# Patient Record
Sex: MALE | Race: WHITE | HISPANIC OR LATINO | Employment: FULL TIME | URBAN - METROPOLITAN AREA
[De-identification: names, ages, dates, MRNs, and addresses within clinical notes are randomized per-mention and may not be internally consistent; named-entity substitution may affect disease eponyms.]

---

## 2017-04-06 ENCOUNTER — OFFICE VISIT (OUTPATIENT)
Dept: INTERNAL MEDICINE | Facility: CLINIC | Age: 37
End: 2017-04-06
Payer: COMMERCIAL

## 2017-04-06 VITALS
DIASTOLIC BLOOD PRESSURE: 94 MMHG | WEIGHT: 239.44 LBS | TEMPERATURE: 98 F | BODY MASS INDEX: 33.39 KG/M2 | SYSTOLIC BLOOD PRESSURE: 132 MMHG | HEART RATE: 80 BPM

## 2017-04-06 DIAGNOSIS — Z00.00 ROUTINE GENERAL MEDICAL EXAMINATION AT A HEALTH CARE FACILITY: Primary | ICD-10-CM

## 2017-04-06 DIAGNOSIS — R53.83 FATIGUE, UNSPECIFIED TYPE: ICD-10-CM

## 2017-04-06 DIAGNOSIS — N34.2 URETHRITIS: ICD-10-CM

## 2017-04-06 DIAGNOSIS — R30.0 DYSURIA: ICD-10-CM

## 2017-04-06 DIAGNOSIS — B00.9 HSV (HERPES SIMPLEX VIRUS) INFECTION: ICD-10-CM

## 2017-04-06 PROCEDURE — 99214 OFFICE O/P EST MOD 30 MIN: CPT | Mod: S$GLB,,, | Performed by: INTERNAL MEDICINE

## 2017-04-06 PROCEDURE — 1160F RVW MEDS BY RX/DR IN RCRD: CPT | Mod: S$GLB,,, | Performed by: INTERNAL MEDICINE

## 2017-04-06 PROCEDURE — 99999 PR PBB SHADOW E&M-EST. PATIENT-LVL III: CPT | Mod: PBBFAC,,, | Performed by: INTERNAL MEDICINE

## 2017-04-06 RX ORDER — VALACYCLOVIR HYDROCHLORIDE 1 G/1
1000 TABLET, FILM COATED ORAL 2 TIMES DAILY
Qty: 14 TABLET | Refills: 2 | Status: SHIPPED | OUTPATIENT
Start: 2017-04-06 | End: 2021-11-05

## 2017-04-06 RX ORDER — AZITHROMYCIN 1 G/1
1 POWDER, FOR SUSPENSION ORAL ONCE
Qty: 1 PACKET | Refills: 0 | Status: SHIPPED | OUTPATIENT
Start: 2017-04-06 | End: 2017-04-06

## 2017-04-10 ENCOUNTER — OFFICE VISIT (OUTPATIENT)
Dept: INTERNAL MEDICINE | Facility: CLINIC | Age: 37
End: 2017-04-10
Payer: COMMERCIAL

## 2017-04-10 DIAGNOSIS — Z71.89 ENCOUNTER FOR MEDICATION REVIEW AND COUNSELING: Primary | ICD-10-CM

## 2017-04-10 DIAGNOSIS — E78.2 MIXED HYPERLIPIDEMIA: ICD-10-CM

## 2017-04-10 DIAGNOSIS — B00.9 ROUTINE CULTURES POSITIVE FOR HSV1: ICD-10-CM

## 2017-04-10 DIAGNOSIS — R79.89 ELEVATED LFTS: ICD-10-CM

## 2017-04-10 DIAGNOSIS — R82.81 PYURIA: ICD-10-CM

## 2017-04-10 PROCEDURE — 99999 PR PBB SHADOW E&M-EST. PATIENT-LVL I: CPT | Mod: PBBFAC,,, | Performed by: INTERNAL MEDICINE

## 2017-04-10 PROCEDURE — 99213 OFFICE O/P EST LOW 20 MIN: CPT | Mod: S$GLB,,, | Performed by: INTERNAL MEDICINE

## 2017-04-10 PROCEDURE — 1160F RVW MEDS BY RX/DR IN RCRD: CPT | Mod: S$GLB,,, | Performed by: INTERNAL MEDICINE

## 2017-04-10 NOTE — PROGRESS NOTES
Subjective:       Patient ID: Jl Haynes is a 36 y.o. male.    Chief Complaint: Annual Exam    HPIPleasant young gentleman from [place] originally here for his annual exam. Overall doing well, but he reports continued discomfort in the l testicle. This is not new, but he has seen Urology in the past for epididymitis and treated symptomatically. He currently is having some dysuria, but denies penile dc.  While conversing with him, I noticed small blister-like lesion lower lip. It appeared c/w herpes infection and I discussed this issue with him. This was his first outbreak stating that he recently noted it after having sexual intercourse with his female partner. He also noted the slight dysuria as well. I discussed HSV infection in particular and STD's in general. The importance of contact prevention was emphasized. He stated he had never had these issues before. I will obtain blood work to look into this issue and treat accordingly. I therefore gave him a rx for Valtrex with instructions as well as single dose of Zithromax 1 gram for NC urethritis. I recommended he discuss this issues with his partner and have her follow up with her physician also. Otherwise doing well.  Review of Systems   Constitutional: Positive for fatigue. Negative for activity change and unexpected weight change.   HENT: Negative.    Eyes: Negative for visual disturbance.   Respiratory: Negative for cough, shortness of breath and wheezing.    Cardiovascular: Negative for chest pain, palpitations and leg swelling.   Gastrointestinal: Negative for abdominal distention, abdominal pain and blood in stool.   Endocrine: Negative.    Genitourinary: Positive for dysuria and testicular pain. Negative for decreased urine volume, difficulty urinating, discharge, frequency, hematuria and urgency.   Musculoskeletal: Negative for arthralgias, back pain and joint swelling.   Skin: Negative.    Neurological: Negative for dizziness, weakness,  light-headedness and headaches.   Hematological: Negative.        Objective:      Physical Exam   Constitutional: He is oriented to person, place, and time. He appears well-developed and well-nourished. No distress.   HENT:   Head: Normocephalic and atraumatic.   Right Ear: External ear normal.   Left Ear: External ear normal.   Mouth/Throat: Oropharynx is clear and moist. No oropharyngeal exudate.   Eyes: Conjunctivae and EOM are normal. Pupils are equal, round, and reactive to light. Right eye exhibits no discharge. Left eye exhibits no discharge. No scleral icterus.   Neck: Normal range of motion. Neck supple. No JVD present. No thyromegaly present.   Cardiovascular: Normal rate, regular rhythm, normal heart sounds and intact distal pulses.    No murmur heard.  Pulmonary/Chest: Effort normal and breath sounds normal. No respiratory distress. He has no wheezes. He exhibits no tenderness.   Abdominal: Soft. Bowel sounds are normal. He exhibits no distension and no mass. There is no tenderness.   Genitourinary: Penis normal.   Genitourinary Comments: Testes descended. No testicular masses. Mld tenderness along L epididymis. No urethral meatus discharge.   Musculoskeletal: Normal range of motion. He exhibits no edema or tenderness.   Lymphadenopathy:     He has no cervical adenopathy.   Neurological: He is alert and oriented to person, place, and time. No cranial nerve deficit.   Skin: Skin is warm and dry. He is not diaphoretic. No erythema.   Small, blister-like lesion L bottom lip.   Psychiatric: He has a normal mood and affect. His behavior is normal. Judgment and thought content normal.   Nursing note and vitals reviewed.      Assessment:       1. Routine general medical examination at a health care facility    2. HSV (herpes simplex virus) infection    3. Fatigue, unspecified type    4. Dysuria    5. Urethritis     6.     Chronic testicular pain - Hx of epididymitis.  Plan:    1. Obtain labs including HSV 1&2  IgM/IgG serology.         2. Obtain urine.         3. Rx for Valtrex 1 gram tid x 7 days as qwll rx for Zithromax 1 gram po x 1 dose. Risks/benfits discussed.         4. Refer to Urology.         5. RTC for review.

## 2017-04-10 NOTE — MR AVS SNAPSHOT
[facility]  [address]  [place] [address]  Phone: [phone]  Fax: [phone]                  Jl Haynes   4/10/2017 2:00 PM   Office Visit    Descripción:  Male : 1980   Personal Médico:  Omid Nix MD   Departamento:  [facility]           Razón de la charo     Follow-up           Diagnósticos de Esta Visita        Comentarios    Encounter for medication review and counseling    -  Primario     Routine cultures positive for HSV1         Mixed hyperlipidemia         Elevated LFTs         Pyuria                Lista de tareas           Citas próximas        Personal Médico Departamento Tfno del dpto    2017 3:00 PM [facility] - Urology 4th Floor [phone]      Metas (5 Years of Data)     Ninguna      Follow-Up and Disposition     Return in about 6 months (around 10/10/2017).      [facility]     [facility] Enfermeras - Asistencia   Enfermeras registradas de [facility] pueden ayudarle a reservar yakov charo, proveer educación para la michael, asesoría clínica, y otros servicios de asesoramiento.   Llame para clarisse servicio gratuito a [phone].             Medicamentos           Mensaje sobre Medicamentos     Verificar los cambios y / o adiciones a ferrera régimen de medicación son los mismos que discutir con ferrera médico. Si cualquiera de estos cambios o adiciones son incorrectos, por favor notifique a ferrera proveedor de atención médica.             Verifique que la siguiente lista de medicamentos es yakov representación exacta de los medicamentos que está tomando actualmente. Si no hay ningunos reportados, la lista puede estar en shirley. Si no es correcta, por favor póngase en contacto con ferrera proveedor de atención médica. Lleve esta lista con usted en sen de emergencia.           Medicamentos Actuales     ibuprofen (ADVIL,MOTRIN) 600 MG tablet Take 1 tablet (600 mg total) by mouth every 6 (six)  hours as needed for Pain.    valacyclovir (VALTREX) 1000 MG tablet Take 1 tablet (1,000 mg total) by mouth 2 (two) times daily.           Información de referencia clínica           Alergias     A partir del:  4/10/2017        Penicillins      Vacunas     Administradas en la fecha de la visita:  4/10/2017        None      Registrarse para [facility]     La activación de ferrera cuenta [facility] es tan fácil rose 1-2-3!    1) Ir a [email or web address], seleccione Registrarse Ahora, meter el código de activación y ferrera fecha de nacimiento, y seleccione Próximo.    [id number]  Expires: 5/25/2017  3:22 PM      2) Crear un nombre de usuario y contraseña para usar cuando se visita [facility] en el futuro y selecciona yakov pregunta de seguridad en sen de que pierda ferrera contraseña y seleccione Próximo.    3) Introduzca ferrera dirección de correo electrónico y enrique clpolina en Registrarse!    Información Adicional  Si tiene alguna pregunta, por favor, e-mail [email or web address] o llame al [phone] para hablar con nuestro personal. Recuerde, [facility] no debe ser usada para necesidades urgentes. En sen de emergencia médica, llame al [phone].        Smoking Cessation     Si desea dejar de fumar:  · Usted puede ser elegible para recibir servicios gratuitos si usted es un residente de [place] y comenzó a fumar cigarrillos antes del 1 de septiembre de 1988. Llame al Smoking Cessation Trust (SCT) a [phone] o [phone].   Llame 1-800-QUIT-NOW si usted no cumple con los criterios anteriores.   Póngase en contacto con nosotros por correo electrónico: [email or web address]   Visite nuestro sitio web para obtener más información: [email or web address]            Language Assistance Services     ATTENTION: Language assistance services are available, free of charge. Please call [phone].      ATENCIÓN: Si habla español, tiene a ferrera disposición servicios gratuitos de asistencia lingüística. Llame al [phone].     CHÚ  Ý: N?u b?n nói Ti?ng Vi?t, có các d?ch v? h? tr? ngôn ng? mi?n phí dành cho b?n. G?i s? [phone].         [facility] cumple con las leyes federales aplicables de derechos civiles y no discrimina por motivos de jazmin, color, origen nacional, edad, discapacidad, o sexo.                 Jl Haynes   4/10/2017 2:00 PM   Office Visit    Description:  Male : 1980   Provider:  Omid Nix MD   Department:  [facility]           Reason for Visit     Follow-up           Diagnoses this Visit        Comments    Encounter for medication review and counseling    -  Primary     Routine cultures positive for HSV1         Mixed hyperlipidemia         Elevated LFTs         Pyuria                To Do List           Future Appointments        Provider Department Dept Phone    2017 3:00 PM [facility] - Urology 4th Floor [phone]      Goals     None      Follow-Up and Disposition     Return in about 6 months (around 10/10/2017).      [facility]     [facility] Nurse Care Line -  Assistance  Unless otherwise directed by your provider, please contact [facility], our nurse care line that is available for  assistance.     Registered nurses in the [facility] provide: appointment scheduling, clinical advisement, health education, and other advisory services.  Call: [phone] (toll free)               Medications           Message regarding Medications     Verify the changes and/or additions to your medication regime listed below are the same as discussed with your clinician today.  If any of these changes or additions are incorrect, please notify your healthcare provider.             Verify that the below list of medications is an accurate representation of the medications you are currently taking.  If none reported, the list may be blank. If incorrect, please contact your healthcare provider. Carry this list  with you in case of emergency.           Current Medications     ibuprofen (ADVIL,MOTRIN) 600 MG tablet Take 1 tablet (600 mg total) by mouth every 6 (six) hours as needed for Pain.    valacyclovir (VALTREX) 1000 MG tablet Take 1 tablet (1,000 mg total) by mouth 2 (two) times daily.           Clinical Reference Information           Allergies as of 4/10/2017     Penicillins      Immunizations Administered on Date of Encounter - 4/10/2017     None      [facility] Sign-Up     Activating your [facility] account is as easy as 1-2-3!     1) Visit [email or web address], select Sign Up Now, enter this activation code and your date of birth, then select Next.  [email or web address]  Expires: 5/25/2017  3:22 PM      2) Create a username and password to use when you visit [facility] in the future and select a security question in case you lose your password and select Next.    3) Enter your e-mail address and click Sign Up!    Additional Information  If you have questions, please e-mail [email or web address] or call [phone] to talk to our [facility] staff. Remember, [facility] is NOT to be used for urgent needs. For medical emergencies, dial 911.         Smoking Cessation     If you would like to quit smoking:   You may be eligible for free services if you are a [place] resident and started smoking cigarettes before September 1, 1988.  Call the Smoking Cessation Trust ([facility]) toll free at (500) 208-5838 or (232) 094-1562.   Call 1-800-QUIT-NOW if you do not meet the above criteria.   Contact us via email: [email or web address]   View our website for more information: [email or web address]        Language Assistance Services     ATTENTION: Language assistance services are available, free of charge. Please call [phone].      ATENCIÓN: Si habla español, tiene a ferrera disposición servicios gratuitos de asistencia lingüística. Llame al [phone].     CHÚ Ý: N?u b?n nói Ti?ng Vi?t, có các d?ch v? h? tr? ngôn ng? mi?n phí  dành cho b?n. G?i s? [phone].         [facility] complies with applicable Federal civil rights laws and does not discriminate on the basis of race, color, national origin, age, disability, or sex.

## 2018-03-25 ENCOUNTER — HOSPITAL ENCOUNTER (EMERGENCY)
Facility: HOSPITAL | Age: 38
Discharge: HOME | End: 2018-03-25
Attending: EMERGENCY MEDICINE
Payer: COMMERCIAL

## 2018-03-25 VITALS
BODY MASS INDEX: 34.3 KG/M2 | WEIGHT: 245 LBS | HEART RATE: 71 BPM | DIASTOLIC BLOOD PRESSURE: 85 MMHG | SYSTOLIC BLOOD PRESSURE: 138 MMHG | TEMPERATURE: 98 F | RESPIRATION RATE: 20 BRPM | HEIGHT: 71 IN | OXYGEN SATURATION: 98 %

## 2018-03-25 DIAGNOSIS — R51.9 HEADACHE: Primary | ICD-10-CM

## 2018-03-25 PROCEDURE — 96372 THER/PROPH/DIAG INJ SC/IM: CPT

## 2018-03-25 PROCEDURE — 99284 EMERGENCY DEPT VISIT MOD MDM: CPT | Mod: 25

## 2018-03-25 PROCEDURE — 63600175 PHARM REV CODE 636 W HCPCS: Performed by: EMERGENCY MEDICINE

## 2018-03-25 PROCEDURE — 25000003 PHARM REV CODE 250: Performed by: EMERGENCY MEDICINE

## 2018-03-25 RX ORDER — BUTALBITAL, ACETAMINOPHEN AND CAFFEINE 50; 325; 40 MG/1; MG/1; MG/1
1 TABLET ORAL
Status: COMPLETED | OUTPATIENT
Start: 2018-03-25 | End: 2018-03-25

## 2018-03-25 RX ORDER — KETOROLAC TROMETHAMINE 30 MG/ML
60 INJECTION, SOLUTION INTRAMUSCULAR; INTRAVENOUS
Status: COMPLETED | OUTPATIENT
Start: 2018-03-25 | End: 2018-03-25

## 2018-03-25 RX ORDER — BUTALBITAL, ACETAMINOPHEN AND CAFFEINE 50; 325; 40 MG/1; MG/1; MG/1
1 TABLET ORAL EVERY 6 HOURS PRN
Qty: 12 TABLET | Refills: 0 | Status: SHIPPED | OUTPATIENT
Start: 2018-03-25 | End: 2018-04-24

## 2018-03-25 RX ADMIN — BUTALBITAL, ACETAMINOPHEN, AND CAFFEINE 1 TABLET: 50; 325; 40 TABLET ORAL at 12:03

## 2018-03-25 RX ADMIN — KETOROLAC TROMETHAMINE 60 MG: 30 INJECTION, SOLUTION INTRAMUSCULAR at 02:03

## 2018-03-25 NOTE — ED PROVIDER NOTES
Encounter Date: 3/25/2018    SCRIBE #1 NOTE: I, Carolyn Campuzano, am scribing for, and in the presence of,  Dr. Torres. I have scribed the entire note.       History     Chief Complaint   Patient presents with    Headache     x 3 days, with sinus pressure      12:32 PM    This is a 37 y.o. male who has a past medical history of Depression and Hyperlipidemia.   The patient presents to the Emergency Department with headache.   The onset began a 1 month ago.  He specifies that he will typically have a headache twice a week; it has now progressed to everyday x3-4 days  Symptoms are associated with photophobia and sinus pressure.  Pt denies visual disturbances, dizziness, numbness, or tingling.   Symptoms are aggravated by sound and light.   No pre-hospital treatments were attempted.  Patient has had prior history of similar symptoms. Denies any trauma or injury.  Pt has no past surgical history on file.      The history is provided by the patient.     Review of patient's allergies indicates:   Allergen Reactions    Penicillins Anaphylaxis     Past Medical History:   Diagnosis Date    Depression     Hyperlipidemia      History reviewed. No pertinent surgical history.  Family History   Problem Relation Age of Onset    Prostate cancer Neg Hx     Kidney disease Neg Hx      Social History   Substance Use Topics    Smoking status: Current Some Day Smoker    Smokeless tobacco: Not on file    Alcohol use Yes      Comment: Rare social occasions.     Review of Systems   Constitutional: Negative for activity change.   HENT: Positive for sinus pressure. Negative for congestion and rhinorrhea.    Eyes: Positive for photophobia. Negative for visual disturbance.   Respiratory: Negative for shortness of breath.    Cardiovascular: Negative for chest pain.   Gastrointestinal: Negative for nausea and vomiting.   Musculoskeletal: Negative for myalgias, neck pain and neck stiffness.   Neurological: Positive for headaches. Negative  for dizziness and numbness.        No tingling sensation.   Hematological: Does not bruise/bleed easily.   Psychiatric/Behavioral: Positive for sleep disturbance. Negative for confusion.       Physical Exam     Initial Vitals [03/25/18 1122]   BP Pulse Resp Temp SpO2   (!) 160/91 101 20 98.1 °F (36.7 °C) 98 %      MAP       114         Physical Exam    Nursing note and vitals reviewed.  Constitutional: He appears well-developed and well-nourished. He is not diaphoretic. No distress.   HENT:   Head: Normocephalic and atraumatic.   Mouth/Throat: Oropharynx is clear and moist.   Eyes: Conjunctivae are normal. Pupils are equal, round, and reactive to light.   Neck: Normal range of motion. Neck supple.   Cardiovascular: Normal rate, regular rhythm, normal heart sounds and intact distal pulses.   No murmur heard.  Pulmonary/Chest: Breath sounds normal. No respiratory distress. He has no wheezes. He has no rhonchi. He has no rales.   Musculoskeletal: Normal range of motion. He exhibits no edema or tenderness.   Lymphadenopathy:     He has no cervical adenopathy.   Neurological: He is alert and oriented to person, place, and time. He has normal strength. No cranial nerve deficit or sensory deficit.   Negative pronator drift, negative finger to nose, negative heel to shin, negative dysdiadochokinesia. Negative Romberg, normal gait. GCS 15.    Skin: Skin is warm and dry. No rash noted. No erythema.   Psychiatric: He has a normal mood and affect. Thought content normal.         ED Course   Procedures  Labs Reviewed - No data to display     Imaging Results          CT Head Without Contrast (Final result)  Result time 03/25/18 13:34:32    Final result by Foster Hoffmann MD (03/25/18 13:34:32)                 Impression:      No acute intracranial abnormality.      Electronically signed by: Foster Hoffmann MD  Date:    03/25/2018  Time:    13:34             Narrative:    EXAMINATION:  CT HEAD WITHOUT CONTRAST    CLINICAL  HISTORY:  Headache, acute, norm neuro exam; Headache    TECHNIQUE:  Low dose axial images were obtained through the head.  Coronal and sagittal reformations were also performed. Contrast was not administered.    COMPARISON:  None.    FINDINGS:  The cortical sulcal pattern is normal. No hydrocephalus, midline shift or abnormal mass effect present. No intra-or extra-axial mass or hemorrhage. No CT evidence of large territory acute stroke.    Orbits are unremarkable. Paranasal sinuses are clear. Mastoid air cells are clear. Calvarium is intact.                                   Medical Decision Making:   Initial Assessment:   This is an emergent evaluation of a 37 y.o.male patient with presentation of headache fro 1month anterior cranial prcoess  Initial differentials include but are not limited to: Anterior cranial process including tumor and tension headache  Plan: CT and fioricet.    Independently Interpreted Test(s):   I have ordered and independently interpreted X-rays - see prior notes.  Clinical Tests:   Radiological Study: Ordered and Reviewed      Patient reports that headache mildly improved after Fioricet.  We'll give IM shot of Toradol and discharged with Fioricet as needed.  Head CT was unremarkable for acute intracranial process.  Overall impression is migraine headache.    Clinical impression and plan discussed with patient.    Pt is to call for follow up with PCP in 7 days.  Pt to return to the ED for any new or concerning symptoms.  Aftercare instructions and return precautions provided to patient.   Pt expressed understanding and agrees with plan.              Attending Attestation:           Physician Attestation for Scribe:      Comments: I, Dr. Torsten Torres, personally performed the services described in this documentation.   All medical record entries made by the scribe were at my direction and in my presence.   I have reviewed the chart and agree that the record is accurate and complete.   Torsten  MD Melissa.                  Clinical Impression:     1. Headache                        Torsten Torres MD  03/25/18 6065

## 2018-03-26 ENCOUNTER — PES CALL (OUTPATIENT)
Dept: ADMINISTRATIVE | Facility: CLINIC | Age: 38
End: 2018-03-26

## 2018-05-28 ENCOUNTER — HOSPITAL ENCOUNTER (OUTPATIENT)
Dept: CARDIOLOGY | Facility: CLINIC | Age: 38
Discharge: HOME | End: 2018-05-28
Payer: COMMERCIAL

## 2018-05-28 ENCOUNTER — OFFICE VISIT (OUTPATIENT)
Dept: INTERNAL MEDICINE | Facility: CLINIC | Age: 38
End: 2018-05-28
Payer: COMMERCIAL

## 2018-05-28 VITALS
HEART RATE: 68 BPM | WEIGHT: 239.38 LBS | SYSTOLIC BLOOD PRESSURE: 142 MMHG | BODY MASS INDEX: 33.39 KG/M2 | DIASTOLIC BLOOD PRESSURE: 94 MMHG | TEMPERATURE: 98 F

## 2018-05-28 DIAGNOSIS — R03.0 ELEVATED BLOOD PRESSURE READING WITHOUT DIAGNOSIS OF HYPERTENSION: ICD-10-CM

## 2018-05-28 DIAGNOSIS — G43.909 MIGRAINE WITHOUT STATUS MIGRAINOSUS, NOT INTRACTABLE, UNSPECIFIED MIGRAINE TYPE: ICD-10-CM

## 2018-05-28 DIAGNOSIS — Z12.5 PROSTATE CANCER SCREENING: ICD-10-CM

## 2018-05-28 DIAGNOSIS — Z00.00 ROUTINE GENERAL MEDICAL EXAMINATION AT A HEALTH CARE FACILITY: Primary | ICD-10-CM

## 2018-05-28 DIAGNOSIS — Z00.00 ROUTINE GENERAL MEDICAL EXAMINATION AT A HEALTH CARE FACILITY: ICD-10-CM

## 2018-05-28 PROCEDURE — 99214 OFFICE O/P EST MOD 30 MIN: CPT | Mod: S$GLB,,, | Performed by: INTERNAL MEDICINE

## 2018-05-28 PROCEDURE — 99999 PR PBB SHADOW E&M-EST. PATIENT-LVL III: CPT | Mod: PBBFAC,,, | Performed by: INTERNAL MEDICINE

## 2018-05-28 PROCEDURE — 93000 ELECTROCARDIOGRAM COMPLETE: CPT | Mod: S$GLB,,, | Performed by: INTERNAL MEDICINE

## 2018-05-28 RX ORDER — HYDROCHLOROTHIAZIDE 12.5 MG/1
12.5 CAPSULE ORAL DAILY
Qty: 90 CAPSULE | Refills: 1 | Status: SHIPPED | OUTPATIENT
Start: 2018-05-28 | End: 2018-10-11 | Stop reason: SDUPTHER

## 2018-05-28 NOTE — PROGRESS NOTES
Subjective:       Patient ID: Jl Haynes is a 37 y.o. male.    Chief Complaint: Annual Exam    HPIPleasant gentleman from [place] originally here for his annual exam. Overall doing well,  But reports frequent headaches. He was recently seen in ER for a persistent HA. Imaging studies were unremarkable and was dc'd in improved condition with dx of migraines. He was given limited rx for Fioricet, but he mostly takes several Ibuprofens when the HA is present which helps. I cautioned him about taking excess NSAID's as these could contribute to HA as well. 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 He has a FHx of HTN in both parents and is likely having issues in this regard as well. I decided to start him on low dose HCTZ 12.5 mgs daily and see how he does. Risks/benefits were discussed. I will see him back in a couple of weeks and see how he is doing.  Review of Systems   Constitutional: Negative for activity change, appetite change, fatigue and unexpected weight change.   HENT: Negative.    Eyes: Negative for visual disturbance.   Respiratory: Negative for cough, shortness of breath and wheezing.    Cardiovascular: Negative for chest pain, palpitations and leg swelling.   Gastrointestinal: Negative for abdominal distention, abdominal pain and blood in stool.   Genitourinary: Negative for difficulty urinating.   Musculoskeletal: Negative for arthralgias, back pain and joint swelling.   Skin: Negative.    Neurological: Positive for headaches. Negative for dizziness, tremors, weakness, light-headedness and numbness.   Hematological: Negative.        Objective:      Physical Exam   Constitutional: He is oriented to person, place, and time. He appears well-developed and well-nourished. No distress.   HENT:   Head: Normocephalic and atraumatic.   Right Ear: External ear  normal.   Left Ear: External ear normal.   Mouth/Throat: Oropharynx is clear and moist. No oropharyngeal exudate.   Eyes: Conjunctivae and EOM are normal. Pupils are equal, round, and reactive to light. Right eye exhibits no discharge. Left eye exhibits no discharge. No scleral icterus.   Neck: Normal range of motion. Neck supple. No JVD present. No thyromegaly present.   Cardiovascular: Normal rate, regular rhythm, normal heart sounds and intact distal pulses.    No murmur heard.  Pulmonary/Chest: Effort normal and breath sounds normal. No respiratory distress. He has no wheezes. He exhibits no tenderness.   Abdominal: Soft. Bowel sounds are normal. He exhibits no distension and no mass. There is no tenderness.   Musculoskeletal: Normal range of motion. He exhibits no edema or tenderness.   Lymphadenopathy:     He has no cervical adenopathy.   Neurological: He is alert and oriented to person, place, and time. No cranial nerve deficit. Coordination normal.   Skin: Skin is warm and dry. No rash noted. He is not diaphoretic. No erythema.   Psychiatric: He has a normal mood and affect. His behavior is normal. Judgment and thought content normal.   Nursing note and vitals reviewed.      Assessment:       1. Routine general medical examination at a health care facility    2. Elevated blood pressure reading without diagnosis of hypertension    3. Prostate cancer screening     4.     Hx of headaches - likely combination of migraines/BP.  Plan:    1. Obtain labs.         2. EKG.         3. Start HCTZ 12.5 mgs daily.         4. Monitor BP; keep diary.         5. RTC 2 weeks for review and BP check.

## 2018-06-12 ENCOUNTER — TELEPHONE (OUTPATIENT)
Dept: NEUROLOGY | Facility: CLINIC | Age: 38
End: 2018-06-12

## 2018-06-12 NOTE — TELEPHONE ENCOUNTER
Called patient to let him know that  We needed to reschedule his appointment that he have ion July 31

## 2018-07-06 ENCOUNTER — TELEPHONE (OUTPATIENT)
Dept: NEUROLOGY | Facility: CLINIC | Age: 38
End: 2018-07-06

## 2018-07-06 NOTE — TELEPHONE ENCOUNTER
----- Message from Ivan Lam sent at 7/6/2018  1:55 PM CDT -----  Contact: Beth blanco [facility] Dept @ ext [phone]  Caller is calling to r/s the 7-31th appt till the 7th  or 8th of August, pt will be out of town on the 31th, pls call

## 2018-07-06 NOTE — TELEPHONE ENCOUNTER
Attempted callback to Ms. Campos in International dept. Dr. Frances will be out of the office from July 30th -August 10th. Appt will need to be after that time or with a different provider.

## 2018-07-12 ENCOUNTER — TELEPHONE (OUTPATIENT)
Dept: NEUROLOGY | Facility: CLINIC | Age: 38
End: 2018-07-12

## 2018-08-08 ENCOUNTER — OFFICE VISIT (OUTPATIENT)
Dept: INTERNAL MEDICINE | Facility: CLINIC | Age: 38
End: 2018-08-08
Payer: COMMERCIAL

## 2018-08-08 ENCOUNTER — LAB VISIT (OUTPATIENT)
Dept: LAB | Facility: HOSPITAL | Age: 38
End: 2018-08-08
Attending: INTERNAL MEDICINE
Payer: COMMERCIAL

## 2018-08-08 VITALS — SYSTOLIC BLOOD PRESSURE: 124 MMHG | HEART RATE: 64 BPM | DIASTOLIC BLOOD PRESSURE: 82 MMHG

## 2018-08-08 DIAGNOSIS — E78.5 HYPERLIPIDEMIA, UNSPECIFIED HYPERLIPIDEMIA TYPE: ICD-10-CM

## 2018-08-08 DIAGNOSIS — E55.9 VITAMIN D DEFICIENCY: ICD-10-CM

## 2018-08-08 DIAGNOSIS — I10 HYPERTENSION, UNSPECIFIED TYPE: ICD-10-CM

## 2018-08-08 DIAGNOSIS — I10 HYPERTENSION, UNSPECIFIED TYPE: Primary | ICD-10-CM

## 2018-08-08 LAB
25(OH)D3+25(OH)D2 SERPL-MCNC: 18 NG/ML
ALBUMIN SERPL BCP-MCNC: 4 G/DL
ALP SERPL-CCNC: 72 U/L
ALT SERPL W/O P-5'-P-CCNC: 44 U/L
ANION GAP SERPL CALC-SCNC: 6 MMOL/L
AST SERPL-CCNC: 24 U/L
BILIRUB SERPL-MCNC: 0.7 MG/DL
BUN SERPL-MCNC: 11 MG/DL
CALCIUM SERPL-MCNC: 9.5 MG/DL
CHLORIDE SERPL-SCNC: 106 MMOL/L
CHOLEST SERPL-MCNC: 174 MG/DL
CHOLEST/HDLC SERPL: 4.7 {RATIO}
CO2 SERPL-SCNC: 26 MMOL/L
CREAT SERPL-MCNC: 1 MG/DL
EST. GFR  (AFRICAN AMERICAN): >60 ML/MIN/1.73 M^2
EST. GFR  (NON AFRICAN AMERICAN): >60 ML/MIN/1.73 M^2
GLUCOSE SERPL-MCNC: 111 MG/DL
HDLC SERPL-MCNC: 37 MG/DL
HDLC SERPL: 21.3 %
LDLC SERPL CALC-MCNC: 99.4 MG/DL
NONHDLC SERPL-MCNC: 137 MG/DL
POTASSIUM SERPL-SCNC: 4.1 MMOL/L
PROT SERPL-MCNC: 7.3 G/DL
SODIUM SERPL-SCNC: 138 MMOL/L
TRIGL SERPL-MCNC: 188 MG/DL

## 2018-08-08 PROCEDURE — 36415 COLL VENOUS BLD VENIPUNCTURE: CPT

## 2018-08-08 PROCEDURE — 80053 COMPREHEN METABOLIC PANEL: CPT

## 2018-08-08 PROCEDURE — 99214 OFFICE O/P EST MOD 30 MIN: CPT | Mod: S$GLB,,, | Performed by: INTERNAL MEDICINE

## 2018-08-08 PROCEDURE — 99999 PR PBB SHADOW E&M-EST. PATIENT-LVL III: CPT | Mod: PBBFAC,,, | Performed by: INTERNAL MEDICINE

## 2018-08-08 PROCEDURE — 80061 LIPID PANEL: CPT

## 2018-08-08 PROCEDURE — 82306 VITAMIN D 25 HYDROXY: CPT

## 2018-08-08 RX ORDER — ASPIRIN 325 MG
TABLET, DELAYED RELEASE (ENTERIC COATED) ORAL
Qty: 12 CAPSULE | Refills: 0 | Status: SHIPPED | OUTPATIENT
Start: 2018-08-08 | End: 2022-02-03 | Stop reason: SDUPTHER

## 2018-08-08 NOTE — PROGRESS NOTES
Subjective:       Patient ID: Jl Haynes is a 37 y.o. male.    Chief Complaint: Follow-up and Hypertension    HPIMr Parikh is here today for follow up HTN and review of previous labs. I had started him on HCTZ last visit 2o elevated BP and HA. He recently spent one month in [place] visiting family and forgot to bring the HCTZ with him. His wife, a physician in [place], gave him rx for Ibersartan 300 mgs daily and has tolerated it well. I discussed the difference between these two meds and their indications. His HA are still present, but improved. His BP was much better today. I will obtain blood work for comparison and ascertain that the ARB is not causing any issues.  I gave him copies of the blood work obtained last visit and discussed them in detail. These showed improved lipids, but low vitamin D. I gave him rx for supplementation 50 K units weekly x 12 weeks. All else was unremarkable.  Review of Systems   All other systems reviewed and are negative.      Objective:      Physical Exam   Constitutional: He is oriented to person, place, and time. He appears well-developed and well-nourished. No distress.   HENT:   Head: Normocephalic and atraumatic.   Right Ear: External ear normal.   Left Ear: External ear normal.   Mouth/Throat: Oropharynx is clear and moist. No oropharyngeal exudate.   Neck: Normal range of motion. Neck supple. No JVD present. No thyromegaly present.   Cardiovascular: Normal rate, regular rhythm, normal heart sounds and intact distal pulses.    No murmur heard.  Pulmonary/Chest: Effort normal and breath sounds normal. No respiratory distress. He has no wheezes. He exhibits no tenderness.   Abdominal: Soft. Bowel sounds are normal. He exhibits no distension and no mass. There is no tenderness.   Musculoskeletal: Normal range of motion. He exhibits no edema or tenderness.   Lymphadenopathy:     He has no cervical adenopathy.   Neurological: He is alert and oriented to person, place, and time.  No cranial nerve deficit. Coordination normal.   Skin: Skin is warm and dry. No rash noted. He is not diaphoretic. No erythema.   Psychiatric: He has a normal mood and affect. His behavior is normal.   Vitals reviewed.      Assessment:       1. Hypertension, unspecified type    2. Vitamin D deficiency    3. Hyperlipidemia, unspecified hyperlipidemia type        Plan:    1. Obtain blood work.         2. Continue with current BP meds and monitor BP; keep diary.         3. Call with results.         4. RTC 3 months.

## 2018-10-11 DIAGNOSIS — R03.0 ELEVATED BLOOD PRESSURE READING WITHOUT DIAGNOSIS OF HYPERTENSION: ICD-10-CM

## 2018-10-11 RX ORDER — HYDROCHLOROTHIAZIDE 12.5 MG/1
12.5 CAPSULE ORAL DAILY
Qty: 90 CAPSULE | Refills: 2 | Status: SHIPPED | OUTPATIENT
Start: 2018-10-11 | End: 2019-03-13

## 2018-10-18 ENCOUNTER — TELEPHONE (OUTPATIENT)
Dept: INTERNAL MEDICINE | Facility: CLINIC | Age: 38
End: 2018-10-18

## 2018-10-19 ENCOUNTER — OFFICE VISIT (OUTPATIENT)
Dept: INTERNAL MEDICINE | Facility: CLINIC | Age: 38
End: 2018-10-19
Payer: COMMERCIAL

## 2018-10-19 ENCOUNTER — LAB VISIT (OUTPATIENT)
Dept: LAB | Facility: HOSPITAL | Age: 38
End: 2018-10-19
Attending: INTERNAL MEDICINE
Payer: COMMERCIAL

## 2018-10-19 VITALS
BODY MASS INDEX: 33.74 KG/M2 | DIASTOLIC BLOOD PRESSURE: 96 MMHG | WEIGHT: 241.88 LBS | HEART RATE: 64 BPM | SYSTOLIC BLOOD PRESSURE: 136 MMHG

## 2018-10-19 DIAGNOSIS — I10 HYPERTENSION, UNSPECIFIED TYPE: Primary | ICD-10-CM

## 2018-10-19 DIAGNOSIS — R53.83 FATIGUE, UNSPECIFIED TYPE: ICD-10-CM

## 2018-10-19 DIAGNOSIS — E55.9 VITAMIN D DEFICIENCY: ICD-10-CM

## 2018-10-19 DIAGNOSIS — I10 HYPERTENSION, UNSPECIFIED TYPE: ICD-10-CM

## 2018-10-19 DIAGNOSIS — N50.812 LEFT TESTICULAR PAIN: ICD-10-CM

## 2018-10-19 LAB
BILIRUB UR QL STRIP: NEGATIVE
CLARITY UR REFRACT.AUTO: CLEAR
COLOR UR AUTO: YELLOW
GLUCOSE UR QL STRIP: NEGATIVE
HGB UR QL STRIP: NEGATIVE
KETONES UR QL STRIP: NEGATIVE
LEUKOCYTE ESTERASE UR QL STRIP: NEGATIVE
NITRITE UR QL STRIP: NEGATIVE
PH UR STRIP: 6 [PH] (ref 5–8)
PROT UR QL STRIP: NEGATIVE
SP GR UR STRIP: 1.01 (ref 1–1.03)
URN SPEC COLLECT METH UR: NORMAL
UROBILINOGEN UR STRIP-ACNC: NEGATIVE EU/DL

## 2018-10-19 PROCEDURE — 81003 URINALYSIS AUTO W/O SCOPE: CPT

## 2018-10-19 PROCEDURE — 99999 PR PBB SHADOW E&M-EST. PATIENT-LVL III: CPT | Mod: PBBFAC,,, | Performed by: INTERNAL MEDICINE

## 2018-10-19 PROCEDURE — 99213 OFFICE O/P EST LOW 20 MIN: CPT | Mod: S$GLB,,, | Performed by: INTERNAL MEDICINE

## 2018-10-19 RX ORDER — LOSARTAN POTASSIUM 50 MG/1
50 TABLET ORAL DAILY
Qty: 90 TABLET | Refills: 3 | Status: SHIPPED | OUTPATIENT
Start: 2018-10-19 | End: 2019-06-27 | Stop reason: DRUGHIGH

## 2018-10-19 NOTE — PROGRESS NOTES
Subjective:       Patient ID: Jl Haynes is a 37 y.o. male.    Chief Complaint: Follow-up and Hypertension    Percy Haynes is here today for follow up HTN. He recently contacted me via e-mail from [place] informing me of his HA's recurrence. He ran out of BP meds while on duty and experienced the HA as before. He has not refilled the medication as it cause him to urinate too often. He also noted some ED when he began taking it. He also has hx of persistent pain in his L testicle. He has been seen thruout the last couple years by Urology for this. He had hx of epididymitis and wants referral to Urology again which I will do.    Review of Systems   Constitutional: Positive for fatigue.       Objective:      Physical Exam   Constitutional: He is oriented to person, place, and time. He appears well-developed and well-nourished. No distress.   HENT:   Head: Normocephalic and atraumatic.   Right Ear: External ear normal.   Left Ear: External ear normal.   Mouth/Throat: Oropharynx is clear and moist. No oropharyngeal exudate.   Neck: Normal range of motion. Neck supple. No JVD present. No thyromegaly present.   Cardiovascular: Normal rate, regular rhythm, normal heart sounds and intact distal pulses.   No murmur heard.  Pulmonary/Chest: Effort normal and breath sounds normal. He has no wheezes.   Abdominal: Soft. Bowel sounds are normal. He exhibits no distension and no mass. There is no tenderness.   Musculoskeletal: Normal range of motion. He exhibits no edema or tenderness.   Lymphadenopathy:     He has no cervical adenopathy.   Neurological: He is alert and oriented to person, place, and time. No cranial nerve deficit.   Skin: Skin is warm and dry. He is not diaphoretic.   Psychiatric: He has a normal mood and affect. His behavior is normal.   Nursing note and vitals reviewed.      Assessment:       1. Hypertension, unspecified type    2. Vitamin D deficiency    3. Fatigue, unspecified type    4. Left  testicular pain        Plan:    1. Start Losartan 50 mgs daily.         2. Stop HCTZ.         3. Obtain labs including total testosterone levels.         4. Refer to Urology.         5. RTC for review.

## 2018-12-14 ENCOUNTER — OFFICE VISIT (OUTPATIENT)
Dept: UROLOGY | Facility: CLINIC | Age: 38
End: 2018-12-14
Payer: COMMERCIAL

## 2018-12-14 ENCOUNTER — OFFICE VISIT (OUTPATIENT)
Dept: INTERNAL MEDICINE | Facility: CLINIC | Age: 38
End: 2018-12-14
Payer: COMMERCIAL

## 2018-12-14 VITALS
BODY MASS INDEX: 34.88 KG/M2 | HEART RATE: 88 BPM | WEIGHT: 249.13 LBS | DIASTOLIC BLOOD PRESSURE: 68 MMHG | SYSTOLIC BLOOD PRESSURE: 115 MMHG | HEIGHT: 71 IN

## 2018-12-14 VITALS
DIASTOLIC BLOOD PRESSURE: 80 MMHG | HEART RATE: 72 BPM | BODY MASS INDEX: 34.38 KG/M2 | SYSTOLIC BLOOD PRESSURE: 128 MMHG | WEIGHT: 246.5 LBS

## 2018-12-14 DIAGNOSIS — I10 HYPERTENSION, UNSPECIFIED TYPE: Primary | ICD-10-CM

## 2018-12-14 DIAGNOSIS — R35.1 NOCTURIA: ICD-10-CM

## 2018-12-14 DIAGNOSIS — N50.812 PAIN IN LEFT TESTICLE: ICD-10-CM

## 2018-12-14 DIAGNOSIS — G89.29 CHRONIC PAIN IN TESTICLE: Primary | ICD-10-CM

## 2018-12-14 DIAGNOSIS — B35.1 ONYCHOMYCOSIS: ICD-10-CM

## 2018-12-14 DIAGNOSIS — R35.0 URINARY FREQUENCY: ICD-10-CM

## 2018-12-14 DIAGNOSIS — N39.43 POST-VOID DRIBBLING: ICD-10-CM

## 2018-12-14 DIAGNOSIS — N40.1 BPH WITH URINARY OBSTRUCTION: ICD-10-CM

## 2018-12-14 DIAGNOSIS — N45.1 EPIDIDYMITIS: ICD-10-CM

## 2018-12-14 DIAGNOSIS — N13.8 BPH WITH URINARY OBSTRUCTION: ICD-10-CM

## 2018-12-14 DIAGNOSIS — N52.9 ED (ERECTILE DYSFUNCTION) OF ORGANIC ORIGIN: ICD-10-CM

## 2018-12-14 DIAGNOSIS — N50.819 CHRONIC PAIN IN TESTICLE: Primary | ICD-10-CM

## 2018-12-14 LAB
BILIRUB SERPL-MCNC: NORMAL MG/DL
BLOOD URINE, POC: NORMAL
COLOR, POC UA: YELLOW
GLUCOSE UR QL STRIP: NORMAL
KETONES UR QL STRIP: NORMAL
LEUKOCYTE ESTERASE URINE, POC: NORMAL
NITRITE, POC UA: NORMAL
PH, POC UA: 7
PROTEIN, POC: NORMAL
SPECIFIC GRAVITY, POC UA: 1.01
UROBILINOGEN, POC UA: NORMAL

## 2018-12-14 PROCEDURE — 81002 URINALYSIS NONAUTO W/O SCOPE: CPT | Mod: S$GLB,,, | Performed by: NURSE PRACTITIONER

## 2018-12-14 PROCEDURE — 99213 OFFICE O/P EST LOW 20 MIN: CPT | Mod: S$GLB,,, | Performed by: INTERNAL MEDICINE

## 2018-12-14 PROCEDURE — 99204 OFFICE O/P NEW MOD 45 MIN: CPT | Mod: 25,S$GLB,, | Performed by: NURSE PRACTITIONER

## 2018-12-14 PROCEDURE — 51798 US URINE CAPACITY MEASURE: CPT | Mod: S$GLB,,, | Performed by: NURSE PRACTITIONER

## 2018-12-14 PROCEDURE — 99999 PR PBB SHADOW E&M-EST. PATIENT-LVL III: CPT | Mod: PBBFAC,,, | Performed by: NURSE PRACTITIONER

## 2018-12-14 PROCEDURE — 99999 PR PBB SHADOW E&M-EST. PATIENT-LVL III: CPT | Mod: PBBFAC,,, | Performed by: INTERNAL MEDICINE

## 2018-12-14 RX ORDER — OXAPROZIN 600 MG/1
600 TABLET, FILM COATED ORAL EVERY 12 HOURS
Qty: 30 TABLET | Refills: 1 | Status: SHIPPED | OUTPATIENT
Start: 2018-12-14 | End: 2018-12-29

## 2018-12-14 RX ORDER — TAMSULOSIN HYDROCHLORIDE 0.4 MG/1
0.4 CAPSULE ORAL NIGHTLY
Qty: 30 CAPSULE | Refills: 12 | Status: SHIPPED | OUTPATIENT
Start: 2018-12-14 | End: 2020-01-23

## 2018-12-14 NOTE — PROGRESS NOTES
Subjective:       Patient ID: Jl Haynes is a 38 y.o. male.    Chief Complaint: Hypertension    Percy Haynes is here today for follow up HTN. I started him on low dose Losartan last visit for his BP. He has tolerated this med well and has noted improvement in his HA's. He has had issues, however, with testicular pain given his hx of epididymitis. He has appt with Urology later today to discuss options. He noted improvement of his pain after he ejaculated recently and will discuss that fact with Urology. He has been recommended surgery, but was told it might not help. He also reports issues with toenail fungus that he has had for some time. Has tried OTC meds w/o improvement. I discussed alternatives.  Review of Systems   All other systems reviewed and are negative.      Objective:      Physical Exam   Constitutional: He appears well-developed and well-nourished. No distress.   Cardiovascular: Normal rate, regular rhythm, normal heart sounds and intact distal pulses.   No murmur heard.  Pulmonary/Chest: Effort normal and breath sounds normal. No respiratory distress. He has no wheezes. He exhibits no tenderness.   Musculoskeletal: He exhibits no edema.   Neurological: He is alert. No cranial nerve deficit.   Skin: Skin is warm and dry. He is not diaphoretic.   Onychomycosis L 4th toenail.   Psychiatric: He has a normal mood and affect. His behavior is normal.   Nursing note and vitals reviewed.      Assessment:       1. Hypertension, unspecified type    2. Epididymitis    3. Onychomycosis        Plan:    1. Continue with current BP meds.         2. Refer to Urology.         3. RTC 3 months.

## 2018-12-14 NOTE — PATIENT INSTRUCTIONS
Dolor testicular, causa incierta  Usted ha tenido dolor en irish o los dos testículos. El examen que se le hizo hoy no reveló con certeza la causa exacta de ferrera dolor. Sin embargo, ferrera afección no parece ser peligrosa. Los testículos son muy sensibles y hasta la más leve lesión puede provocar dolor considerable. Otras causas posibles del dolor testicular son los cálculos renales, quistes, la parotiditis (paperas), yakov hernia, yakov infección y la torsión de un testículo.  Podrían hacerle ciertas pruebas para descartar la posibilidad de que otro trastorno le esté causando el dolor. Hoy no se encontró nada concluyente. Lo más probable es que el dolor desaparezca por sí solo; en sen contrario, quizás necesite que le juan j otras pruebas.    Cuidados en casa   Para aliviar el dolor y la hinchazón, podrían recomendarle que tome medicamentos de venta florentin o recetarle analgésicos (calmantes del dolor). [place] todos jeny medicamentos de la forma indicada.  La siguiente es yakov guía de cuidados generales:  · Para aliviar el dolor y la hinchazón, aplique yakov compresa de hielo envuelta en yakov toalla martine marcella 10 minutos por vez. Siga haciendo esto de forma intermitente marcella irish a dos días.  · Para brindar apoyo y proteger los testículos, póngase yakov pequeña toalla enrollada debajo del escroto cuando esté acostado y cuando esté levantado use un suspensorio (soporte usado por deportistas) o ropa interior que le sostenga yecenia. [place] ayudará a hany soporte a jeny testículos y los protegererá.  · Si caminar le causa dolor, limite en lo posible esta actividad hasta que se sienta mejor.  · Evite las actividades enérgicas hasta que se sienta mejor.  · Absténgase de las relaciones sexuales hasta que se sienta mejor.  · Si tiene dolor intenso en el testículo, busque atención médica de inmediato. Cualquier demora podría ocasionar la pérdida permanente de la función de jonny testículo.  Visita de control  Roseann un seguimiento con ferrera proveedor de  atención médica, o según le hayan recomendado.  Cuándo debe buscar atención médica  Llame enseguida a ferrera proveedor de atención médica si presenta cualquiera de estos síntomas:  · Fiebre de 100.4° F (38° C) o más gracie  · Dolor intenso o que empeora  · Hinchazón del testículo o el escroto  · Gabby masa en el escroto  · Enrojecimiento y sensación de calor en el escroto (señales de infección)  · Náuseas y vómitos  · Dolor o hinchazón en el abdomen  · Dificultad para orinar  · Entumecimiento o debilidad en la pierna  · Encogimiento del testículo  · Shekhar en la orina  Date Last Reviewed: 12/16/2016  © 7790-0905 [employer]. [address] [place] [address]. Todos los derechos reservados. Esta información no pretende sustituir la atención médica profesional. Sólo ferrera médico puede diagnosticar y tratar un problema de michael.        Problemas de la próstata y síntomas urinarios relacionados    Muchos hombres tienen problemas con la próstata en algún momento de jeny vidas. La glándula prostática (o próstata) es parte del sistema reproductor y está situada judith debajo de la vejiga. La próstata rodea la uretra (el conducto a través del cual se expulsa la orina). Cuando hay problemas en la próstata, a menudo la vejiga y la uretra también se raphael afectadas, lo cual puede hany lugar a trastornos urinarios. Los problemas más comunes de la próstata se describen a continuación.  HPB  La HPB (hiperplasia prostática benigna) se produce cuando los cambios en el nivel de hormonas causan un agrandamiento de la próstata. Hi-Nella suele comenzar a la edad de 50 años. El exceso de tejido puede constreñir la uretra, dificultando así el flujo de orina. El agrandamiento de la próstata también puede hacer presión sobre la vejiga, provocando el deseo de orinar más a menudo. Otros síntomas posibles son: necesidad de esforzarse para orinar, un chorro de orina muy débil, goteo al final de la micción (acto de orinar) y la sensación de  que la vejiga no se vacía completamente. Tenga en cuenta que la HPB no es cáncer y no es yakov causa de cáncer.  Efecto de la HPB sobre la vejiga  El esfuerzo para orinar a través de yakov uretra estrechada puede causar el engrosamiento o el estiramiento y la deformación de las kim de la vejiga. Yakov vejiga estirada puede tener problemas para vaciarse completamente, y hany lugar a infecciones o cálculos (piedras). Además, los riñones no pueden drenar correctamente en yakov vejiga que no se vacía del todo, lo cual puede producir insuficiencia renal. La presión producida por la acumulación de orina también puede causar pérdida de orina (incontinencia por rebosamiento).  Otros trastornos de la próstata  · La prostatitis es yakov infección y/o inflamación que produce dolor e hinchazón de la próstata, lo cual estrecha la uretra y puede bloquear el nico de la vejiga. La prostatitis puede causar yakov sensación de ardor al orinar. También es posible que sienta presión o dolor en la lindsey genital. En algunos casos, la prostatitis puede causar fiebre y escalofríos, y hacer que se sienta muy enfermo.  · El cáncer se produce cuando se forma un tumor o agrupación de células anormales que se reproducen de manera descontrolada. Algunos tumores pueden palparse marcella un examen físico, colin otros no. El cáncer de próstata a menudo no produce síntomas de ningún tipo, especialmente en ferrera etapa inicial. Cuando hay síntomas prostáticos, lo más probable es que la causa NO sea cáncer.   Date Last Reviewed: 9/12/2014  © 4795-8257 [employer]. [address] [place] [address]. Todos los derechos reservados. Esta información no pretende sustituir la atención médica profesional. Sólo ferrera médico puede diagnosticar y tratar un problema de michael.        Bph (Agrandamiento De La Próstata) [Bph (Enlarged Prostate)]  La próstata es yakov glándula que se encuentra en la base de la vejiga. Con la edad, la próstata puede comenzar a  agrandarse en algunos hombres. Dee problema se llama hiperplasia benigna de la próstata (BPH). La BPH causa presión sobre la uretra. Ése es el tubo que lleva la orina desde la vejiga hacia el pene. Puede interferir con el paso de la orina. También puede impedir que la vejiga se vacíe completamente.    Los síntomas de BPH incluyen problemas para comenzar a orinar, y sentir rose si la vejiga no se vaciara completamente. También puede sentir que la orina sale débilmente, que chorrea, o puede sentir deseos frecuentes y urgentes de orinar (especialmente por la noche). El BPH puede aumentar el riesgo de tener infecciones urinarias. También puede llegar a bloquear completamente el paso de la orina. Si esto sucede, quizás deban introducirle un tubo rock (catéter) hasta la vejiga para ayudar a sacar la orina.  Si jeny síntomas son leves, puede que no necesite ningún tratamiento en dee momento. Si jeny síntomas son más graves, probablemente sí necesite tratamiento. El objetivo del tratamiento es mejorar el paso de la orina y reducir los síntomas. Los tratamientos pueden incluir medicamentos y procedimientos. Ferrera médico conversará con usted sobre las opciones de tratamiento, según sea necesario.  Cuidados En [place]:  · Orine crespo pronto sienta la necesidad. No intente contener la orina.  · No limite la cantidad de líquidos que maggy a lo mei del día. Kristina entre 6 y 8 vasos de agua u otros líquidos al día. Eso sriram que se acumulen bacterias en la vejiga.  · Evite beber líquidos después de la mikala. [place] puede reducir jeny deseos de orinar marcella la noche.  · Evite los medicamentos que pueden empeorar los síntomas. Por ejemplo, ciertos medicamentos para el resfriado y la alergia, y los antidepresivos. Los diuréticos que se usan para tratar la gracie presión arterial también pueden empeorar los síntomas. Hable con ferrera médico sobre los medicamentos que tarun. Es posible que haya otros medicamentos que meena mejores para  usted.  Pruebas PARA LA DETECCIÓN DE CÁNCER DE LA PRÓSTATA:  El BPH no aumenta el riesgo de tener cáncer de la próstata. Peg, dado que el cáncer de la próstata es un cáncer común en los hombres, se recomienda que todos los hombres en general se juan j las pruebas de detección. [place] puede ayudar a descubrir el cáncer en jeny primeras etapas, que es cuando el tratamiento es más efectivo. Las pruebas suelen comenzar a hacerse a partir de los 50 años. O puede ser que le indiquen hacerlas antes si usted tiene más riesgo. Los factores que aumentan el riesgo de tener cáncer de la próstata incluyen ser afroamericano o que ferrera padre o irish de jeny hermanos hayan tenido cáncer de la próstata. Yakov dieta gracie en grasas también puede aumentar el riesgo de tener cáncer de la próstata.  Roseann Seguimiento  con ferrera médico o con un urólogo según le hayan indicado. Para obtener más información, consulte:  · National Kidney & Urologic Diseases Information Clearinghouse  kidney.niddk.nih.gov, 880.126.2541  Obtenga Atención Médica De Inmediato  si nota alguno de los siguientes síntomas:  · Tiene fiebre de 100.4°F (38°C) o más, o según le indique ferrera proveedor de atención médica.  · No purdy podido orinar en 8 horas.  · Siente yakov presión o dolor en la vejiga (parte inferior del abdomen) que es cada vez mayor.  · Tiene georges en la orina.  · Tiene mayor dolor en la parte baja de la espalda, no relacionado con yakov lesión.  · Tiene síntomas de infección urinaria (mayores deseos de orinar, ardor al orinar, orina de olor desagradable).  Date Last Reviewed: 1/6/2014  © 5162-0250 [employer]. [address] [place] [address], [place] [address]. Todos los derechos reservados. Esta información no pretende sustituir la atención médica profesional. Sólo ferrera médico puede diagnosticar y tratar un problema de michael.          Cystoscopy    Cystoscopy is a procedure that lets your doctor look directly inside your urethra and bladder. It can be used to:  · Help  diagnose a problem with your urethra, bladder, or kidneys.  · Take a sample (biopsy) of bladder or urethral tissue.  · Treat certain problems (such as removing kidney stones).  · Place a stent to bypass an obstruction.  · Take special X-rays of the kidneys.  Based on the findings, your doctor may recommend other tests or treatments.  What is a cystoscope?  A cystoscope is a telescope-like instrument that contains lenses and fiberoptics (small glass wires that make bright light). The cystoscope may be straight and rigid, or flexible to bend around curves in the urethra. The doctor may look directly into the cystoscope, or project the image onto a monitor.  Getting ready  · Ask your doctor if you should stop taking any medicines before the procedure.  · Ask whether you should avoid eating or drinking anything after midnight before the procedure.  · Follow any other instructions your doctor gives you.  Tell your doctor before the exam if you:  · Take any medicines, such as aspirin or blood thinners  · Have allergies to any medicines  · Are pregnant   The procedure  Cystoscopy is done in the doctors office, surgery center, or hospital. The doctor and a nurse are present during the procedure. It takes only a few minutes, longer if a biopsy, X-ray, or treatment needs to be done.  During the procedure:  · You lie on an exam table on your back, knees bent and legs apart. You are covered with a drape.  · Your urethra and the area around it are washed. Anesthetic jelly may be applied to numb the urethra. Other pain medicine is usually not needed. In some cases, you may be offered a mild sedative to help you relax. If a more extensive procedure is to be done, such as a biopsy or kidney stone removal, general anesthesia may be needed.  · The cystoscope is inserted. A sterile fluid is put into the bladder to expand it. You may feel pressure from this fluid.  · When the procedure is done, the cystoscope is removed.  After the  procedure  If you had a sedative, general anesthesia, or spinal anesthesia, you must have someone drive you home. Once youre home:  · Drink plenty of fluids.  · You may have burning or light bleeding when you urinate--this is normal.  · Medicines may be prescribed to ease any discomfort or prevent infection. Take these as directed.  · Call your doctor if you have heavy bleeding or blood clots, burning that lasts more than a day, a fever over 100°F  (38° C), or trouble urinating.  Date Last Reviewed: 1/1/2017  © 7601-3414 [employer]. [address] [place] [address], [place] [address]. All rights reserved. This information is not intended as a substitute for professional medical care. Always follow your healthcare professional's instructions.

## 2018-12-14 NOTE — PROGRESS NOTES
Subjective:       Patient ID: Jl Haynes is a 38 y.o. male.    Chief Complaint: Testicle Pain (left testicle pain ~10 years)    Jl Haynes is a 38 y.o. Male with chronic left testicle pain.  He states this pain has been going on for years.  It is a constant pain; unrelieved with NSAIDS and scrotal support.  02/06/2012 he had u/s of scrotum that only revealed a few small prominent vessels in the left hemiscrotum, many of which do not fill with color.   He was seen by Dr. Henderson on 09/02/2014.     He reports urinary frequency, nocturia, and post void dribbling again for years.  Denies any coffee teas, sodas.  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    Has some mild issues with ED. They are not as firm as before.                            PSA                      0.72                05/28/2018                        Past Medical History:  No date: Depression  No date: Hyperlipidemia    History reviewed. No pertinent surgical history.    Review of patient's family history indicates:  Problem: Diabetes      Relation: Mother          Age of Onset: (Not Specified)  Problem: Hypertension      Relation: Mother          Age of Onset: (Not Specified)  Problem: Hypertension      Relation: Father          Age of Onset: (Not Specified)  Problem: Prostate cancer      Relation: Neg Hx          Age of Onset: (Not Specified)  Problem: Kidney disease      Relation: Neg Hx          Age of Onset: (Not Specified)      Social History    Socioeconomic History      Marital status: Single      Spouse name: Not on file      Number of children: Not on file      Years of education: Not on file      Highest education level: Not on file    Social Needs      Financial resource strain: Not on file      Food insecurity - worry: Not on file      Food insecurity - inability: Not on file      Transportation needs - medical: Not on file      Transportation needs - non-medical: Not on file    Occupational History      Not on  file    Tobacco Use      Smoking status: Current Some Day Smoker    Substance and Sexual Activity      Alcohol use: Yes        Comment: Rare social occasions.      Drug use: No      Sexual activity: Not Currently    Other Topics      Concerns:        Not on file    Social History Narrative      Not on file      Allergies:  Penicillins    Medications:  Current Outpatient Medications:   hydroCHLOROthiazide (MICROZIDE) 12.5 mg capsule, Take 1 capsule (12.5 mg total) by mouth once daily., Disp: 90 capsule, Rfl: 2  cholecalciferol, vitamin D3, 50,000 unit capsule, Volodymyr 1 tableta por semana x 12 semanas, Disp: 12 capsule, Rfl: 0  losartan (COZAAR) 50 MG tablet, Take 1 tablet (50 mg total) by mouth once daily., Disp: 90 tablet, Rfl: 3    valacyclovir (VALTREX) 1000 MG tablet, Take 1 tablet (1,000 mg total) by mouth 2 (two) times daily., Disp: 14 tablet, Rfl: 2            Review of Systems   Constitutional: Negative for activity change, appetite change, chills and fever.   HENT: Negative for facial swelling and trouble swallowing.    Eyes: Negative for visual disturbance.   Respiratory: Negative for chest tightness and shortness of breath.    Cardiovascular: Negative for chest pain and palpitations.   Gastrointestinal: Negative.  Negative for abdominal pain, constipation, diarrhea, nausea and vomiting.   Genitourinary: Positive for frequency, nocturia, testicular pain and urgency. Negative for difficulty urinating, dysuria, flank pain, hematuria, penile pain, penile swelling and scrotal swelling.        Chronic left testicle pain  Nocturia 4/5x.    Musculoskeletal: Negative for back pain, gait problem, myalgias and neck stiffness.   Skin: Negative for rash.   Neurological: Negative for dizziness and speech difficulty.   Hematological: Does not bruise/bleed easily.   Psychiatric/Behavioral: Negative for behavioral problems.       Objective:      Physical Exam   Nursing note and vitals reviewed.  Constitutional: He is  oriented to person, place, and time. Vital signs are normal. He appears well-developed and well-nourished. He is active and cooperative.  Non-toxic appearance. He does not have a sickly appearance.   Urine dipped clear of infection in the office today.   PVR in the office today by the nurse was 0.     HENT:   Head: Normocephalic and atraumatic.   Right Ear: External ear normal.   Left Ear: External ear normal.   Nose: Nose normal.   Mouth/Throat: Mucous membranes are normal.   Eyes: Conjunctivae and lids are normal. No scleral icterus.   Neck: Trachea normal, normal range of motion and full passive range of motion without pain. Neck supple. No JVD present. No tracheal deviation present.   Cardiovascular: Normal rate, S1 normal and S2 normal.    Pulmonary/Chest: Effort normal. No respiratory distress. He exhibits no tenderness.   Abdominal: Soft. Normal appearance and bowel sounds are normal. There is no hepatosplenomegaly. There is no tenderness. There is no CVA tenderness.   Genitourinary: Rectum normal and penis normal. Rectal exam shows no external hemorrhoid, no mass and no tenderness. Prostate is enlarged. Prostate is not tender. Right testis shows no mass, no swelling and no tenderness. Left testis shows tenderness. Left testis shows no mass and no swelling. Uncircumcised. No phimosis, paraphimosis, hypospadias, penile erythema or penile tenderness. No discharge found.       Genitourinary Comments: Testes in a good lie position. Normal contour and no nodules.   some prominent veins on left side.   Pain to left testicle not worsened with palpation and unrelieved with elevation.        Musculoskeletal: Normal range of motion.   Lymphadenopathy: No inguinal adenopathy noted on the right or left side.   Neurological: He is alert and oriented to person, place, and time. He has normal strength.   Skin: Skin is warm, dry and intact.     Psychiatric: He has a normal mood and affect. His behavior is normal. Judgment  and thought content normal.       Assessment:       1. Chronic pain in testicle    2. Pain in left testicle    3. Urinary frequency    4. Nocturia    5. BPH with urinary obstruction    6. Post-void dribbling    7. ED (erectile dysfunction) of organic origin        Plan:         I spent 40 minutes with the patient of which more than half was spent in direct consultation with the patient in regards to our treatment and plan.    Education and recommendations of today's plan of care including home remedies.  We discussed the office findings and past labs and imaging.  We reviewed the possible contributory factors for his LUTS.  We also reviewed the possible contributory factors for his ED (HTN).  Will monitor ED since he ok with that for now.   Recommend he continue scrotal support.  Rx Daypro BID with food.  Flomax for his LUTS; benefits, risks, se especially the retrograde ejaculation.  He will contact me when gets back (~2 weeks).  Voiced understanding

## 2018-12-14 NOTE — LETTER
December 14, 2018      Omid Nix MD  [address]  [facility] [address]           [facility] - Urology 4th Floor  [address]  [facility] [address]  Phone: [phone]          Patient: Jl Haynes   MR Number: [id number]   YOB: 1980   Date of Visit: 12/14/2018       Dear Dr. Omid Nix:    Thank you for referring Jl Haynes to me for evaluation. Attached you will find relevant portions of my assessment and plan of care.    If you have questions, please do not hesitate to call me. I look forward to following Jl Haynes along with you.    Sincerely,    Brielle Abdi, NITA    Enclosure  CC:  No Recipients    If you would like to receive this communication electronically, please contact [email or web address] or [phone] to request more information on [employer] Link access.    For providers and/or their staff who would like to refer a patient to [facility], please contact us through our one-stop-shop provider referral line, [facility], at [phone].    If you feel you have received this communication in error or would no longer like to receive these types of communications, please e-mail [email or web address]

## 2019-01-14 ENCOUNTER — OFFICE VISIT (OUTPATIENT)
Dept: URGENT CARE | Facility: CLINIC | Age: 39
End: 2019-01-14
Payer: COMMERCIAL

## 2019-01-14 VITALS
TEMPERATURE: 98 F | WEIGHT: 250 LBS | RESPIRATION RATE: 19 BRPM | BODY MASS INDEX: 35 KG/M2 | HEIGHT: 71 IN | OXYGEN SATURATION: 98 % | HEART RATE: 82 BPM

## 2019-01-14 DIAGNOSIS — M25.512 PAIN IN JOINT OF LEFT SHOULDER: Primary | ICD-10-CM

## 2019-01-14 PROCEDURE — 3008F PR BODY MASS INDEX (BMI) DOCUMENTED: ICD-10-PCS | Mod: CPTII,S$GLB,, | Performed by: NURSE PRACTITIONER

## 2019-01-14 PROCEDURE — 99214 PR OFFICE/OUTPT VISIT, EST, LEVL IV, 30-39 MIN: ICD-10-PCS | Mod: 25,S$GLB,, | Performed by: NURSE PRACTITIONER

## 2019-01-14 PROCEDURE — 3008F BODY MASS INDEX DOCD: CPT | Mod: CPTII,S$GLB,, | Performed by: NURSE PRACTITIONER

## 2019-01-14 PROCEDURE — 99214 OFFICE O/P EST MOD 30 MIN: CPT | Mod: 25,S$GLB,, | Performed by: NURSE PRACTITIONER

## 2019-01-14 PROCEDURE — 96372 PR INJECTION,THERAP/PROPH/DIAG2ST, IM OR SUBCUT: ICD-10-PCS | Mod: S$GLB,,, | Performed by: NURSE PRACTITIONER

## 2019-01-14 PROCEDURE — 96372 THER/PROPH/DIAG INJ SC/IM: CPT | Mod: S$GLB,,, | Performed by: NURSE PRACTITIONER

## 2019-01-14 RX ORDER — TIZANIDINE 4 MG/1
4 TABLET ORAL EVERY 6 HOURS PRN
Qty: 15 TABLET | Refills: 0 | Status: SHIPPED | OUTPATIENT
Start: 2019-01-14 | End: 2019-01-19

## 2019-01-14 RX ORDER — OXAPROZIN 600 MG/1
600 TABLET, FILM COATED ORAL DAILY
COMMUNITY
End: 2021-11-05

## 2019-01-14 RX ORDER — METHYLPREDNISOLONE 4 MG/1
TABLET ORAL
Qty: 1 PACKAGE | Refills: 0 | Status: SHIPPED | OUTPATIENT
Start: 2019-01-14 | End: 2019-03-13

## 2019-01-14 RX ORDER — KETOROLAC TROMETHAMINE 30 MG/ML
30 INJECTION, SOLUTION INTRAMUSCULAR; INTRAVENOUS
Status: COMPLETED | OUTPATIENT
Start: 2019-01-14 | End: 2019-01-14

## 2019-01-14 RX ADMIN — KETOROLAC TROMETHAMINE 30 MG: 30 INJECTION, SOLUTION INTRAMUSCULAR; INTRAVENOUS at 11:01

## 2019-01-14 NOTE — PROGRESS NOTES
"Subjective:       Patient ID: Jl Haynes is a 38 y.o. male.    Vitals:  height is 5' 11" (1.803 m) and weight is 113.4 kg (250 lb). His oral temperature is 97.6 °F (36.4 °C). His pulse is 82. His respiration is 19 and oxygen saturation is 98%.     Chief Complaint: Shoulder Pain      Patient presents to clinic today with complaints of worsening left shoulder pain over the last 2-3 weeks.  Patient has been using over-the-counter ibuprofen and recently started applied Kinesiology to the affected shoulder over the last 24-48 hours.  He has also been using icy Hot with minimal relief.  Describes the pain as a sharp shooting pain radiating from the left shoulder.  Denies any numbness or tingling.  Complains of pain with worsening movement and range of motion.  Denies any significant trauma there to the shoulder.  Patient does lift heaving loads consistently at work but denies any recent trauma.      Shoulder Pain    The pain is present in the left shoulder. This is a new problem. The current episode started 1 to 4 weeks ago. There has been no history of extremity trauma. The problem occurs constantly. The problem has been gradually worsening. The quality of the pain is described as sharp. The pain is at a severity of 6/10. The pain is moderate. Associated symptoms include tingling. Pertinent negatives include no fever or headaches. Associated symptoms comments: Tingling down to fingertips originally. The symptoms are aggravated by activity. Treatments tried: Ibuprofen. The treatment provided no relief.       Constitution: Negative for chills, fatigue and fever.   HENT: Negative for congestion and sore throat.    Neck: Negative for painful lymph nodes.   Cardiovascular: Negative for chest pain and leg swelling.   Eyes: Negative for double vision and blurred vision.   Respiratory: Negative for cough and shortness of breath.    Gastrointestinal: Negative for nausea, vomiting and diarrhea.   Genitourinary: Negative for " dysuria, frequency and urgency.   Musculoskeletal: Negative for joint pain, joint swelling, muscle cramps and muscle ache.   Skin: Negative for color change, pale and rash.   Allergic/Immunologic: Negative for seasonal allergies.   Neurological: Negative for dizziness, history of vertigo, light-headedness, passing out and headaches.   Hematologic/Lymphatic: Negative for swollen lymph nodes, easy bruising/bleeding and history of blood clots. Does not bruise/bleed easily.   Psychiatric/Behavioral: Negative for nervous/anxious, sleep disturbance and depression. The patient is not nervous/anxious.        Objective:      Physical Exam   Constitutional: He is oriented to person, place, and time. He appears well-developed and well-nourished. He is cooperative.  Non-toxic appearance. He does not appear ill. No distress.   HENT:   Head: Normocephalic and atraumatic. Head is without abrasion, without contusion and without laceration.   Right Ear: Hearing, tympanic membrane, external ear and ear canal normal. No hemotympanum.   Left Ear: Hearing, tympanic membrane, external ear and ear canal normal. No hemotympanum.   Nose: Nose normal. No mucosal edema, rhinorrhea or nasal deformity. No epistaxis. Right sinus exhibits no maxillary sinus tenderness and no frontal sinus tenderness. Left sinus exhibits no maxillary sinus tenderness and no frontal sinus tenderness.   Mouth/Throat: Uvula is midline, oropharynx is clear and moist and mucous membranes are normal. No trismus in the jaw. Normal dentition. No uvula swelling. No posterior oropharyngeal erythema.   Eyes: Conjunctivae, EOM and lids are normal. Pupils are equal, round, and reactive to light. Right eye exhibits no discharge. Left eye exhibits no discharge. No scleral icterus.   Sclera clear bilat   Neck: Trachea normal, normal range of motion, full passive range of motion without pain and phonation normal. Neck supple. No spinous process tenderness and no muscular  tenderness present. No neck rigidity. No tracheal deviation present.   Cardiovascular: Normal rate, regular rhythm, normal heart sounds, intact distal pulses and normal pulses.   Pulses:       Radial pulses are 2+ on the right side, and 2+ on the left side.   Pulmonary/Chest: Effort normal and breath sounds normal. No respiratory distress.   Abdominal: Soft. Normal appearance and bowel sounds are normal. He exhibits no distension, no pulsatile midline mass and no mass. There is no tenderness.   Musculoskeletal: Normal range of motion. He exhibits no edema or deformity.        Left shoulder: He exhibits tenderness, bony tenderness, pain and spasm. He exhibits normal range of motion, normal pulse and normal strength.   Neurological: He is alert and oriented to person, place, and time. He has normal strength. No cranial nerve deficit or sensory deficit. He exhibits normal muscle tone. He displays no seizure activity. Coordination normal. GCS eye subscore is 4. GCS verbal subscore is 5. GCS motor subscore is 6.   Skin: Skin is warm, dry and intact. Capillary refill takes less than 2 seconds. No abrasion, no bruising, no burn, no ecchymosis and no laceration noted. He is not diaphoretic. No pallor.   Psychiatric: He has a normal mood and affect. His speech is normal and behavior is normal. Judgment and thought content normal. Cognition and memory are normal.   Nursing note and vitals reviewed.          Assessment:       1. Pain in joint of left shoulder        Plan:         Pain in joint of left shoulder  -     ketorolac injection 30 mg  -     methylPREDNISolone (MEDROL DOSEPACK) 4 mg tablet; use as directed  Dispense: 1 Package; Refill: 0  -     tiZANidine (ZANAFLEX) 4 MG tablet; Take 1 tablet (4 mg total) by mouth every 6 (six) hours as needed.  Dispense: 15 tablet; Refill: 0      Patient Instructions       Shoulder Impingement Syndrome  The rotator cuff is a group of muscles and tendons that surround the shoulder  joint. These muscles and tendons hold the arm in its joint. They help the shoulder move. The rotator cuff muscles and tendons can become irritated from repeated rubbing against the shoulder bone. This is called shoulder impingement syndrome or rotator cuff tendonitis.     If your case is mild, you may only need to rest the shoulder and then do certain exercises to strengthen the muscles. You can also take anti-inflammatory medicines. Steroid injections into the shoulder can ease inflammation. But you can have only a limited number of these. If the condition gets worse, your shoulder muscles may become thin and weak. This can lead to a rotator cuff tear.  Symptoms of shoulder impingement syndrome may include:  · Shoulder pain that gets worse when you raise your arm overhead  · Weakness of the shoulder muscles when you use your arm overhead  · Popping and clicking when you move your shoulder  · Shoulder pain that wakes you up at night, especially when you sleep on the affected shoulder  · Sudden pain in your shoulder when you lift or reach  Home care  Follow these tips to take care of yourself at home:  · Avoid activities that make your pain worse. These include raising your arms overhead, repeating the same motion over and over, or lifting heavy objects.  · Dont hold your arm in one position for a long time. Keep it moving.  · Put an ice pack on the sore area for 20 minutes every 1 to 2 hours for the first day. You can make an ice pack by putting ice cubes in a plastic bag. Wrap the bag in a towel before putting it on your shoulder. A frozen bag of peas or something similar can also be used as an ice pack. Use the ice packs 3 to 4 times a day for the next 2 days. Continue using the ice to relieve of pain and swelling as needed.  · You may take acetaminophen or ibuprofen to control pain, unless another medicine was prescribed. If prednisone was prescribed, dont take anti-inflammatory medicines. If you have chronic  liver or kidney disease or ever had a stomach ulcer or gastrointestinal bleeding, talk with your doctor before using these medicines.  · After your symptoms ease, you may get physical therapy or start a home exercise program. This can strengthen your shoulder muscles and help your range of motion. Talk with your doctor about what is best for your condition.  Follow-up care  Follow up with your healthcare provider, or as advised.  When to seek medical advice  Call your healthcare provider right away if any of these occur:  · Shoulder pain that gets worse and wakes you up at night  · Your shoulder or arm swells  · Numbness, tingling, or pain that travels down the arm to the hand  · Loss of shoulder strength  · Fever or chills  Date Last Reviewed: 8/1/2016  © 6169-1636 [employer]. [address] [place] [address]. All rights reserved. This information is not intended as a substitute for professional medical care. Always follow your healthcare professional's instructions.        Exercises for Shoulder Flexibility: Broom Stretch    Improving your flexibility can reduce pain. Stretching exercises also can help increase your range of pain-free motion. Breathe normally when you exercise. Try to use smooth, fluid movements. Never force a stretch.  · Stand up or lie on the floor. Place the palm of your hand over the end of a broomstick or cane. Grasp the stick farther down with the other hand, palm facing down.  · Push the end of the stick up on the side of your injured shoulder as high as is reasonably comfortable.  · Hold for a few seconds. Return to the starting position.  · Repeat 3 to 5 times. Build up to holding each stretch for 10 to 30 seconds.  Note: Follow any special instructions you are given. If you feel pain, stop the exercise. If the pain continues after stopping, call your healthcare provider.   Date Last Reviewed: 10/14/2015  © 5525-6111 [employer]. [address] [place] [address]  Road, [place] [address]. All rights reserved. This information is not intended as a substitute for professional medical care. Always follow your healthcare professional's instructions.      -Toradol shot given here today.  -Muscle relaxants as needed for muscle spasms, do not drive or operate machinery while taking.  -Medrol dose pack.  -Continue to alternate ice and heat.  -Follow up with your primary care doctor.  Please follow up with your Primary care provider within 2-5 days if your signs and symptoms have not resolved or worsen.     If your condition worsens or fails to improve we recommend that you receive another evaluation at the emergency room immediately or contact your primary medical clinic to discuss your concerns.   You must understand that you have received an Urgent Care treatment only and that you may be released before all of your medical problems are known or treated. You, the patient, will arrange for follow up care as instructed.

## 2019-01-14 NOTE — PATIENT INSTRUCTIONS
Shoulder Impingement Syndrome  The rotator cuff is a group of muscles and tendons that surround the shoulder joint. These muscles and tendons hold the arm in its joint. They help the shoulder move. The rotator cuff muscles and tendons can become irritated from repeated rubbing against the shoulder bone. This is called shoulder impingement syndrome or rotator cuff tendonitis.     If your case is mild, you may only need to rest the shoulder and then do certain exercises to strengthen the muscles. You can also take anti-inflammatory medicines. Steroid injections into the shoulder can ease inflammation. But you can have only a limited number of these. If the condition gets worse, your shoulder muscles may become thin and weak. This can lead to a rotator cuff tear.  Symptoms of shoulder impingement syndrome may include:  · Shoulder pain that gets worse when you raise your arm overhead  · Weakness of the shoulder muscles when you use your arm overhead  · Popping and clicking when you move your shoulder  · Shoulder pain that wakes you up at night, especially when you sleep on the affected shoulder  · Sudden pain in your shoulder when you lift or reach  Home care  Follow these tips to take care of yourself at home:  · Avoid activities that make your pain worse. These include raising your arms overhead, repeating the same motion over and over, or lifting heavy objects.  · Dont hold your arm in one position for a long time. Keep it moving.  · Put an ice pack on the sore area for 20 minutes every 1 to 2 hours for the first day. You can make an ice pack by putting ice cubes in a plastic bag. Wrap the bag in a towel before putting it on your shoulder. A frozen bag of peas or something similar can also be used as an ice pack. Use the ice packs 3 to 4 times a day for the next 2 days. Continue using the ice to relieve of pain and swelling as needed.  · You may take acetaminophen or ibuprofen to control pain, unless another  medicine was prescribed. If prednisone was prescribed, dont take anti-inflammatory medicines. If you have chronic liver or kidney disease or ever had a stomach ulcer or gastrointestinal bleeding, talk with your doctor before using these medicines.  · After your symptoms ease, you may get physical therapy or start a home exercise program. This can strengthen your shoulder muscles and help your range of motion. Talk with your doctor about what is best for your condition.  Follow-up care  Follow up with your healthcare provider, or as advised.  When to seek medical advice  Call your healthcare provider right away if any of these occur:  · Shoulder pain that gets worse and wakes you up at night  · Your shoulder or arm swells  · Numbness, tingling, or pain that travels down the arm to the hand  · Loss of shoulder strength  · Fever or chills  Date Last Reviewed: 8/1/2016  © 2556-3084 [employer]. [address] [place] [address]. All rights reserved. This information is not intended as a substitute for professional medical care. Always follow your healthcare professional's instructions.        Exercises for Shoulder Flexibility: Broom Stretch    Improving your flexibility can reduce pain. Stretching exercises also can help increase your range of pain-free motion. Breathe normally when you exercise. Try to use smooth, fluid movements. Never force a stretch.  · Stand up or lie on the floor. Place the palm of your hand over the end of a broomstick or cane. Grasp the stick farther down with the other hand, palm facing down.  · Push the end of the stick up on the side of your injured shoulder as high as is reasonably comfortable.  · Hold for a few seconds. Return to the starting position.  · Repeat 3 to 5 times. Build up to holding each stretch for 10 to 30 seconds.  Note: Follow any special instructions you are given. If you feel pain, stop the exercise. If the pain continues after stopping, call your  healthcare provider.   Date Last Reviewed: 10/14/2015  © 1708-6147 [employer]. [address] [place] [address], [place] [address]. All rights reserved. This information is not intended as a substitute for professional medical care. Always follow your healthcare professional's instructions.      -Toradol shot given here today.  -Muscle relaxants as needed for muscle spasms, do not drive or operate machinery while taking.  -Medrol dose pack.  -Continue to alternate ice and heat.  -Follow up with your primary care doctor.  Please follow up with your Primary care provider within 2-5 days if your signs and symptoms have not resolved or worsen.     If your condition worsens or fails to improve we recommend that you receive another evaluation at the emergency room immediately or contact your primary medical clinic to discuss your concerns.   You must understand that you have received an Urgent Care treatment only and that you may be released before all of your medical problems are known or treated. You, the patient, will arrange for follow up care as instructed.

## 2019-02-21 NOTE — PROGRESS NOTES
Mr Haynes here today for his review. He is tolerating Valtrex w/o difficulty and lower lip lesion is resolving.  I gave him copies of his studies and discussed them in detail including blood work that again showed mild increase in ALT at 76 - chronic, fluctuating. Lipids were elevated with chol/TG;s at 202/346 respectively. The implications of this finding vis a vis future health issues was discussed and the need for proper nutrition, exercise, weight management were emphasized. Vitamin D was low at 23 and I recommended daily OTC supplements for this. HSV IgG was positive, but negative HSV 1&2 IgM and IgG except as indicated previously. Urinalysis showed 1+ leukocytes, but no bacteria or other abnormal urine findings. He took the 1 gram Zithromax rx I had given him earlier. All other parameters were within normal limits. He will continue with the Valtrex until completed. I will repeat abnormal blood work in 6 months when he returns to see me.  
scd's and sqh

## 2019-03-13 ENCOUNTER — OFFICE VISIT (OUTPATIENT)
Dept: UROLOGY | Facility: CLINIC | Age: 39
End: 2019-03-13
Payer: COMMERCIAL

## 2019-03-13 VITALS
SYSTOLIC BLOOD PRESSURE: 135 MMHG | HEART RATE: 75 BPM | HEIGHT: 71 IN | WEIGHT: 246.94 LBS | BODY MASS INDEX: 34.57 KG/M2 | DIASTOLIC BLOOD PRESSURE: 85 MMHG

## 2019-03-13 DIAGNOSIS — N52.01 ERECTILE DYSFUNCTION DUE TO ARTERIAL INSUFFICIENCY: ICD-10-CM

## 2019-03-13 DIAGNOSIS — G89.29 CHRONIC PAIN IN TESTICLE: Primary | ICD-10-CM

## 2019-03-13 DIAGNOSIS — N50.819 CHRONIC PAIN IN TESTICLE: Primary | ICD-10-CM

## 2019-03-13 LAB
BILIRUB SERPL-MCNC: NORMAL MG/DL
BLOOD URINE, POC: NORMAL
COLOR, POC UA: YELLOW
GLUCOSE UR QL STRIP: NORMAL
KETONES UR QL STRIP: NORMAL
LEUKOCYTE ESTERASE URINE, POC: NORMAL
NITRITE, POC UA: NORMAL
PH, POC UA: 5
PROTEIN, POC: NORMAL
SPECIFIC GRAVITY, POC UA: 1.01
UROBILINOGEN, POC UA: NORMAL

## 2019-03-13 PROCEDURE — 81002 URINALYSIS NONAUTO W/O SCOPE: CPT | Mod: S$GLB,,, | Performed by: NURSE PRACTITIONER

## 2019-03-13 PROCEDURE — 3079F DIAST BP 80-89 MM HG: CPT | Mod: CPTII,S$GLB,, | Performed by: NURSE PRACTITIONER

## 2019-03-13 PROCEDURE — 3008F PR BODY MASS INDEX (BMI) DOCUMENTED: ICD-10-PCS | Mod: CPTII,S$GLB,, | Performed by: NURSE PRACTITIONER

## 2019-03-13 PROCEDURE — 99999 PR PBB SHADOW E&M-EST. PATIENT-LVL IV: CPT | Mod: PBBFAC,,, | Performed by: NURSE PRACTITIONER

## 2019-03-13 PROCEDURE — 81002 POCT URINE DIPSTICK WITHOUT MICROSCOPE: ICD-10-PCS | Mod: S$GLB,,, | Performed by: NURSE PRACTITIONER

## 2019-03-13 PROCEDURE — 3075F PR MOST RECENT SYSTOLIC BLOOD PRESS GE 130-139MM HG: ICD-10-PCS | Mod: CPTII,S$GLB,, | Performed by: NURSE PRACTITIONER

## 2019-03-13 PROCEDURE — 3079F PR MOST RECENT DIASTOLIC BLOOD PRESSURE 80-89 MM HG: ICD-10-PCS | Mod: CPTII,S$GLB,, | Performed by: NURSE PRACTITIONER

## 2019-03-13 PROCEDURE — 3008F BODY MASS INDEX DOCD: CPT | Mod: CPTII,S$GLB,, | Performed by: NURSE PRACTITIONER

## 2019-03-13 PROCEDURE — 99999 PR PBB SHADOW E&M-EST. PATIENT-LVL IV: ICD-10-PCS | Mod: PBBFAC,,, | Performed by: NURSE PRACTITIONER

## 2019-03-13 PROCEDURE — 99214 OFFICE O/P EST MOD 30 MIN: CPT | Mod: 25,S$GLB,, | Performed by: NURSE PRACTITIONER

## 2019-03-13 PROCEDURE — 99214 PR OFFICE/OUTPT VISIT, EST, LEVL IV, 30-39 MIN: ICD-10-PCS | Mod: 25,S$GLB,, | Performed by: NURSE PRACTITIONER

## 2019-03-13 PROCEDURE — 3075F SYST BP GE 130 - 139MM HG: CPT | Mod: CPTII,S$GLB,, | Performed by: NURSE PRACTITIONER

## 2019-03-13 RX ORDER — SILDENAFIL CITRATE 20 MG/1
TABLET ORAL
Qty: 30 TABLET | Refills: 11 | Status: SHIPPED | OUTPATIENT
Start: 2019-03-13 | End: 2021-11-05

## 2019-03-13 RX ORDER — AMITRIPTYLINE HYDROCHLORIDE 10 MG/1
10 TABLET, FILM COATED ORAL NIGHTLY
Qty: 30 TABLET | Refills: 12 | Status: SHIPPED | OUTPATIENT
Start: 2019-03-13 | End: 2020-05-25

## 2019-03-13 NOTE — PATIENT INSTRUCTIONS
Manejo del dolor crónico: Medicamentos    Los medicamentos pueden ayudarle a vivir mejor con un dolor crónico. Puede don medicamentos de venta florentin o disponibles solo con receta médica. Puede llevar tiempo y pruebas y error para elaborar el mejor plan de tratamiento para usted. Colabore con ferrera proveedor de atención médica para encontrar el medicamento más adecuado para usted, y aprenda a usarlo de un modo seguro y eficaz.  Mencione a ferrera proveedor de atención médica todos los medicamentos que tarun, sin olvidarse de las hierbas medicinales y las vitaminas.   Gabby parte de ferrera plan de tratamiento  Dependiendo de ferrera situación y tipo de dolor, sakshi vez tenga que don medicamentos:  · En momentos cuando el dolor es más intenso de lo habitual.  · Para alivio del dolor a lo mei del día.  · Antes de hacer actividades que tienden a provocar dolor, rose ir de compras o hacer fisioterapia.  · Para disminuir ferrera sensibilidad al dolor y ayudarlo a dormir.  Los medicamentos para el dolor crónico se dividen en cuatro grupos principales.  No opiáceos. Dee venancio incluye el acetaminofén, medicamento de uso común. También se incluyen los medicamentos antiinflamatorios no esteroides (GRACIE), rose la aspirina y el ibuprofeno, el naproxeno sódico y el ketoprofeno. Estos medicamentos ayudan a controlar el dolor, colin los GRACIE también ayudan a aliviar la inflamación. Si yecenia la mayoría de estos medicamentos son de venta florentin, algunos GRACIE solo están disponibles con receta médica.     El acetaminofén puede causar daño al hígado si se tarun más que la dosis recomendada. Los GRACIE pueden causar problemas de estómago, tales rose sangrado y úlceras. Utilizarlos por períodos prolongados puede causar problemas cardíacos y ataque cerebral en un número muy pequeño de personas. Ninguno de estos medicamentos es adictivo.     Opiáceos. [place] incluye fármacos rose la morfina, la oxicodona, la codeína, el fentanilo y la metadona. Los opiáceos pueden  utilizarse para tratar el dolor irruptivo o dolor crónico severo, y solo se venden con receta médica. Estos medicamentos pueden ser eficaces para controlar el dolor crónico, colin son controvertidos debido a que tienen efectos secundarios y a que pueden ser adictivos.     Adyuvantes. Dee venancio incluye medicamentos que se hicieron originalmente para tratar otras afecciones, colin también se descubrió que tienen propiedades analgésicas (calmantes). Ejemplos de fármacos adyuvantes son los antidepresivos y los anticonvulsivos.     Antidepresivos. Ayudan a tratar el dolor trabajando sobre las mismas sustancias químicas del cerebro que juegan un papel en la depresión. También ayudan a dormir mejor. Los antidepresivos tricíclicos son un venancio de antidepresivos usados ??para tratar el dolor crónico causado por lesiones en los nervios (dolor neuropático). Ejemplos de ello son la amitriptilina, la nortriptilina y la desipramina. También se usan inhibidores de la recaptación de serotonina-norepinefrina, rose la duloxetina y el milnaciprán.     Algunos tipos de antidepresivos se utilizan en dosis bajas para los problemas del sueño. También es posible que se los receten si usted tiene mucha sensibilidad al dolor o algunos tipos de neuralgia.  Los anticonvulsivos, desarrollados para prevenir las convulsiones, pueden ayudar con ciertas afecciones que causan dolor, especialmente el dolor de los nervios (neuropatía). Ejemplos de ello son la gabapentina y la pregabalina.  Otros medicamentos para el dolor  · Tópicos. Estos medicamentos, rose la lidocaína o la capsaicina, se aplican a la piel para tratar el dolor en un lugar puntual.  · Relajantes musculares. Estos pueden utilizarse para detener los espasmos musculares dolorosos. Los medicamentos rose la ciclobenzaprina pueden tener un efecto sedante.  ¿Cómo don medicamentos sin peligro?  · [place] ferrera medicamento puntualmente y en la dosis indicada.  · Avise a ferrera proveedor de atención  médica si ferrera medicamento no le yaw el dolor o no surte efecto por un tiempo suficiente, o si usted experimenta efectos secundarios.  · No tome los medicamentos de otra persona, ya que podrían ser peligrosos para usted.  · Evite consumir alcohol, tabaco y drogas ilícitas. Estos pueden interactuar con jeny medicamentos y hacerle daño.  Date Last Reviewed: 6/15/2015  © [id number]. [address] [place] [id number]. Todos los derechos reservados. Esta información no pretende sustituir la atención médica profesional. Sólo ferrera médico puede diagnosticar y tratar un problema de michael.        Qué es la disfunción eréctil    Si usted tiene problemas para lograr o mantener yakov erección, recuerde que hay muchas personas con el mismo problema. La disfunción eréctil consiste en la dificultad para lograr la erección o mantenerla marcella el tiempo suficiente para realizar el acto sexual (coito). Dee problema puede ocurrirle a hombres de cualquier edad, colin los trastornos de michael que pueden provocar disfunción eréctil son más comunes a medida que aumenta la edad. La mitad de los hombres mayores de 40 años tienen disfunción eréctil en algún momento.  Causas de la disfunción eréctil  La disfunción eréctil puede tener diversas causas. La mayoría de ellas son físicas, colin algunas veces se trata de problemas emocionales. A menudo, el problema se debe a yakov combinación de varias causas. Entre las causas de la disfunción eréctil se encuentran las siguientes:  · Trastornos de michael rose diabetes o depresión  · Fumar tabaco o marihuana  · Beber demasiado alcohol  · Efectos secundarios de medicamentos  · Daños a los nervios o a los vasos sanguíneos  · Trastornos emocionales rose estrés o problemas en las relaciones personales  La disfunción eréctil tiene tratamiento  Existen medicamentos con receta para tratar la disfunción eréctil. Estos medicamentos resultan útiles en muchos casos colin, según cuál sea  la causa del problema, sakshi vez no meena suficientes. En jonny sen, es posible que haya otras alternativas de tratamiento, tales rose instrumentos de ayuda para la erección o cirugía. Ferrera proveedor de atención médica podrá darle más información acerca del tratamiento adecuado para usted. Además, se están investigando actualmente nuevos tratamientos. Independientemente del tratamiento que elija, manténgase en contacto con ferrera médico. Si los síntomas continúan, es posible que el médico pueda modificar el tratamiento actual o intentar irish nuevo.  Date Last Reviewed: 9/22/2014  © [id number] [employer]. [address] [place] [id number]. Todos los derechos reservados. Esta información no pretende sustituir la atención médica profesional. Sólo ferrera médico puede diagnosticar y tratar un problema de michael.

## 2019-05-31 ENCOUNTER — TELEPHONE (OUTPATIENT)
Dept: INTERNAL MEDICINE | Facility: CLINIC | Age: 39
End: 2019-05-31

## 2019-05-31 DIAGNOSIS — I10 ESSENTIAL HYPERTENSION: Primary | ICD-10-CM

## 2019-05-31 DIAGNOSIS — E55.9 VITAMIN D DEFICIENCY: ICD-10-CM

## 2019-05-31 DIAGNOSIS — E78.00 PURE HYPERCHOLESTEROLEMIA: ICD-10-CM

## 2019-06-27 ENCOUNTER — LAB VISIT (OUTPATIENT)
Dept: LAB | Facility: HOSPITAL | Age: 39
End: 2019-06-27
Attending: INTERNAL MEDICINE
Payer: COMMERCIAL

## 2019-06-27 ENCOUNTER — OFFICE VISIT (OUTPATIENT)
Dept: INTERNAL MEDICINE | Facility: CLINIC | Age: 39
End: 2019-06-27
Payer: COMMERCIAL

## 2019-06-27 VITALS
HEART RATE: 92 BPM | BODY MASS INDEX: 34.62 KG/M2 | WEIGHT: 248.19 LBS | DIASTOLIC BLOOD PRESSURE: 80 MMHG | SYSTOLIC BLOOD PRESSURE: 138 MMHG

## 2019-06-27 DIAGNOSIS — E78.00 PURE HYPERCHOLESTEROLEMIA: ICD-10-CM

## 2019-06-27 DIAGNOSIS — I10 ESSENTIAL HYPERTENSION: Primary | ICD-10-CM

## 2019-06-27 DIAGNOSIS — I10 ESSENTIAL HYPERTENSION: ICD-10-CM

## 2019-06-27 DIAGNOSIS — E55.9 VITAMIN D DEFICIENCY: ICD-10-CM

## 2019-06-27 LAB
25(OH)D3+25(OH)D2 SERPL-MCNC: 22 NG/ML (ref 30–96)
ALBUMIN SERPL BCP-MCNC: 4.2 G/DL (ref 3.5–5.2)
ALP SERPL-CCNC: 80 U/L (ref 55–135)
ALT SERPL W/O P-5'-P-CCNC: 81 U/L (ref 10–44)
ANION GAP SERPL CALC-SCNC: 9 MMOL/L (ref 8–16)
AST SERPL-CCNC: 38 U/L (ref 10–40)
BASOPHILS # BLD AUTO: 0.03 K/UL (ref 0–0.2)
BASOPHILS NFR BLD: 0.4 % (ref 0–1.9)
BILIRUB SERPL-MCNC: 0.5 MG/DL (ref 0.1–1)
BUN SERPL-MCNC: 8 MG/DL (ref 6–20)
CALCIUM SERPL-MCNC: 9.8 MG/DL (ref 8.7–10.5)
CHLORIDE SERPL-SCNC: 107 MMOL/L (ref 95–110)
CHOLEST SERPL-MCNC: 179 MG/DL (ref 120–199)
CHOLEST/HDLC SERPL: 5 {RATIO} (ref 2–5)
CO2 SERPL-SCNC: 25 MMOL/L (ref 23–29)
CREAT SERPL-MCNC: 1 MG/DL (ref 0.5–1.4)
DIFFERENTIAL METHOD: ABNORMAL
EOSINOPHIL # BLD AUTO: 0.1 K/UL (ref 0–0.5)
EOSINOPHIL NFR BLD: 1.5 % (ref 0–8)
ERYTHROCYTE [DISTWIDTH] IN BLOOD BY AUTOMATED COUNT: 13.3 % (ref 11.5–14.5)
EST. GFR  (AFRICAN AMERICAN): >60 ML/MIN/1.73 M^2
EST. GFR  (NON AFRICAN AMERICAN): >60 ML/MIN/1.73 M^2
ESTIMATED AVG GLUCOSE: 126 MG/DL (ref 68–131)
GLUCOSE SERPL-MCNC: 123 MG/DL (ref 70–110)
HBA1C MFR BLD HPLC: 6 % (ref 4–5.6)
HCT VFR BLD AUTO: 44.5 % (ref 40–54)
HDLC SERPL-MCNC: 36 MG/DL (ref 40–75)
HDLC SERPL: 20.1 % (ref 20–50)
HGB BLD-MCNC: 14.9 G/DL (ref 14–18)
IMM GRANULOCYTES # BLD AUTO: 0.02 K/UL (ref 0–0.04)
IMM GRANULOCYTES NFR BLD AUTO: 0.3 % (ref 0–0.5)
LDLC SERPL CALC-MCNC: 89.8 MG/DL (ref 63–159)
LYMPHOCYTES # BLD AUTO: 2.4 K/UL (ref 1–4.8)
LYMPHOCYTES NFR BLD: 35.9 % (ref 18–48)
MCH RBC QN AUTO: 29.7 PG (ref 27–31)
MCHC RBC AUTO-ENTMCNC: 33.5 G/DL (ref 32–36)
MCV RBC AUTO: 89 FL (ref 82–98)
MONOCYTES # BLD AUTO: 0.6 K/UL (ref 0.3–1)
MONOCYTES NFR BLD: 9 % (ref 4–15)
NEUTROPHILS # BLD AUTO: 3.6 K/UL (ref 1.8–7.7)
NEUTROPHILS NFR BLD: 52.9 % (ref 38–73)
NONHDLC SERPL-MCNC: 143 MG/DL
NRBC BLD-RTO: 0 /100 WBC
PLATELET # BLD AUTO: 241 K/UL (ref 150–350)
PMV BLD AUTO: 8.7 FL (ref 9.2–12.9)
POTASSIUM SERPL-SCNC: 4.3 MMOL/L (ref 3.5–5.1)
PROT SERPL-MCNC: 7.4 G/DL (ref 6–8.4)
RBC # BLD AUTO: 5.02 M/UL (ref 4.6–6.2)
SODIUM SERPL-SCNC: 141 MMOL/L (ref 136–145)
TRIGL SERPL-MCNC: 266 MG/DL (ref 30–150)
TSH SERPL DL<=0.005 MIU/L-ACNC: 1.02 UIU/ML (ref 0.4–4)
WBC # BLD AUTO: 6.77 K/UL (ref 3.9–12.7)

## 2019-06-27 PROCEDURE — 85025 COMPLETE CBC W/AUTO DIFF WBC: CPT

## 2019-06-27 PROCEDURE — 99999 PR PBB SHADOW E&M-EST. PATIENT-LVL III: ICD-10-PCS | Mod: PBBFAC,,, | Performed by: INTERNAL MEDICINE

## 2019-06-27 PROCEDURE — 36415 COLL VENOUS BLD VENIPUNCTURE: CPT

## 2019-06-27 PROCEDURE — 99213 PR OFFICE/OUTPT VISIT, EST, LEVL III, 20-29 MIN: ICD-10-PCS | Mod: S$GLB,,, | Performed by: INTERNAL MEDICINE

## 2019-06-27 PROCEDURE — 3008F PR BODY MASS INDEX (BMI) DOCUMENTED: ICD-10-PCS | Mod: CPTII,S$GLB,, | Performed by: INTERNAL MEDICINE

## 2019-06-27 PROCEDURE — 80061 LIPID PANEL: CPT

## 2019-06-27 PROCEDURE — 83036 HEMOGLOBIN GLYCOSYLATED A1C: CPT

## 2019-06-27 PROCEDURE — 82306 VITAMIN D 25 HYDROXY: CPT

## 2019-06-27 PROCEDURE — 3079F PR MOST RECENT DIASTOLIC BLOOD PRESSURE 80-89 MM HG: ICD-10-PCS | Mod: CPTII,S$GLB,, | Performed by: INTERNAL MEDICINE

## 2019-06-27 PROCEDURE — 99213 OFFICE O/P EST LOW 20 MIN: CPT | Mod: S$GLB,,, | Performed by: INTERNAL MEDICINE

## 2019-06-27 PROCEDURE — 3079F DIAST BP 80-89 MM HG: CPT | Mod: CPTII,S$GLB,, | Performed by: INTERNAL MEDICINE

## 2019-06-27 PROCEDURE — 80053 COMPREHEN METABOLIC PANEL: CPT

## 2019-06-27 PROCEDURE — 3075F SYST BP GE 130 - 139MM HG: CPT | Mod: CPTII,S$GLB,, | Performed by: INTERNAL MEDICINE

## 2019-06-27 PROCEDURE — 84443 ASSAY THYROID STIM HORMONE: CPT

## 2019-06-27 PROCEDURE — 3008F BODY MASS INDEX DOCD: CPT | Mod: CPTII,S$GLB,, | Performed by: INTERNAL MEDICINE

## 2019-06-27 PROCEDURE — 3075F PR MOST RECENT SYSTOLIC BLOOD PRESS GE 130-139MM HG: ICD-10-PCS | Mod: CPTII,S$GLB,, | Performed by: INTERNAL MEDICINE

## 2019-06-27 PROCEDURE — 99999 PR PBB SHADOW E&M-EST. PATIENT-LVL III: CPT | Mod: PBBFAC,,, | Performed by: INTERNAL MEDICINE

## 2019-06-27 RX ORDER — LOSARTAN POTASSIUM 100 MG/1
100 TABLET ORAL DAILY
Qty: 90 TABLET | Refills: 3 | Status: SHIPPED | OUTPATIENT
Start: 2019-06-27 | End: 2020-05-25

## 2019-07-12 NOTE — PROGRESS NOTES
Subjective:       Patient ID: Jl Haynes is a 38 y.o. male.    Chief Complaint: Hypertension    Percy Haynes is here today for follow up HTN. He sent me an e-mal last week stating his BP's were elevated and having HA's especially in the AM hours. He is on Cozaar 50 mgs, but I increased the dose for better controll and will monitor his BP.  Review of Systems   All other systems reviewed and are negative.      Objective:      Physical Exam   Constitutional: He is oriented to person, place, and time. He appears well-developed and well-nourished. No distress.   Neck: Normal range of motion. Neck supple. No JVD present. No thyromegaly present.   Cardiovascular: Normal rate, regular rhythm, normal heart sounds and intact distal pulses.   No murmur heard.  Pulmonary/Chest: Effort normal and breath sounds normal. No respiratory distress. He has no wheezes.   Abdominal: Soft. Bowel sounds are normal. There is no tenderness.   Musculoskeletal: Normal range of motion. He exhibits no edema.   Lymphadenopathy:     He has no cervical adenopathy.   Neurological: He is alert and oriented to person, place, and time. No cranial nerve deficit. Coordination normal.   Skin: Skin is warm and dry. He is not diaphoretic.   Psychiatric: He has a normal mood and affect. His behavior is normal.   Nursing note and vitals reviewed.      Assessment:       1. Essential hypertension        Plan:    1. Increase Cozaar to 100 mgs daily.         2. Monitor BP; keep diary.         3. RTC 1 month for follow up.

## 2020-01-23 DIAGNOSIS — N39.43 POST-VOID DRIBBLING: ICD-10-CM

## 2020-01-23 DIAGNOSIS — N13.8 BPH WITH URINARY OBSTRUCTION: ICD-10-CM

## 2020-01-23 DIAGNOSIS — R35.1 NOCTURIA: ICD-10-CM

## 2020-01-23 DIAGNOSIS — R35.0 URINARY FREQUENCY: ICD-10-CM

## 2020-01-23 DIAGNOSIS — N40.1 BPH WITH URINARY OBSTRUCTION: ICD-10-CM

## 2020-01-23 DIAGNOSIS — N50.812 PAIN IN LEFT TESTICLE: ICD-10-CM

## 2020-01-23 DIAGNOSIS — N50.819 CHRONIC PAIN IN TESTICLE: ICD-10-CM

## 2020-01-23 DIAGNOSIS — G89.29 CHRONIC PAIN IN TESTICLE: ICD-10-CM

## 2020-01-23 RX ORDER — TAMSULOSIN HYDROCHLORIDE 0.4 MG/1
CAPSULE ORAL
Qty: 90 CAPSULE | Refills: 3 | Status: SHIPPED | OUTPATIENT
Start: 2020-01-23 | End: 2021-11-05

## 2020-05-23 DIAGNOSIS — I10 ESSENTIAL HYPERTENSION: ICD-10-CM

## 2020-05-23 DIAGNOSIS — Z11.59 SCREENING FOR VIRAL DISEASE: Primary | ICD-10-CM

## 2020-05-23 DIAGNOSIS — N52.01 ERECTILE DYSFUNCTION DUE TO ARTERIAL INSUFFICIENCY: ICD-10-CM

## 2020-05-23 DIAGNOSIS — G89.29 CHRONIC PAIN IN TESTICLE: ICD-10-CM

## 2020-05-23 DIAGNOSIS — N50.819 CHRONIC PAIN IN TESTICLE: ICD-10-CM

## 2020-05-25 RX ORDER — LOSARTAN POTASSIUM 100 MG/1
TABLET ORAL
Qty: 90 TABLET | Refills: 3 | Status: SHIPPED | OUTPATIENT
Start: 2020-05-25 | End: 2021-10-05 | Stop reason: SDUPTHER

## 2020-05-25 RX ORDER — AMITRIPTYLINE HYDROCHLORIDE 10 MG/1
TABLET, FILM COATED ORAL
Qty: 30 TABLET | Refills: 12 | Status: SHIPPED | OUTPATIENT
Start: 2020-05-25 | End: 2021-11-05

## 2020-09-24 ENCOUNTER — OCCUPATIONAL HEALTH (OUTPATIENT)
Dept: URGENT CARE | Facility: CLINIC | Age: 40
End: 2020-09-24

## 2020-09-24 VITALS — TEMPERATURE: 98 F

## 2020-09-24 DIAGNOSIS — U07.1 COVID-19: Primary | ICD-10-CM

## 2020-09-24 LAB
CTP QC/QA: YES
SARS-COV-2 RDRP RESP QL NAA+PROBE: NEGATIVE

## 2020-09-24 PROCEDURE — 99199 CV19 SPECIMEN HANDLING FEE / SWAB: ICD-10-PCS | Mod: S$GLB,,, | Performed by: NURSE PRACTITIONER

## 2020-09-24 PROCEDURE — U0002 COVID-19 LAB TEST NON-CDC: HCPCS | Mod: QW,S$GLB,, | Performed by: NURSE PRACTITIONER

## 2020-09-24 PROCEDURE — 99199 UNLISTED SPECIAL SVC PX/RPRT: CPT | Mod: S$GLB,,, | Performed by: NURSE PRACTITIONER

## 2020-09-24 PROCEDURE — U0002: ICD-10-PCS | Mod: QW,S$GLB,, | Performed by: NURSE PRACTITIONER

## 2020-09-24 NOTE — PROGRESS NOTES

## 2021-01-01 ENCOUNTER — OFFICE VISIT (OUTPATIENT)
Dept: URGENT CARE | Facility: CLINIC | Age: 41
End: 2021-01-01
Payer: COMMERCIAL

## 2021-01-01 VITALS
DIASTOLIC BLOOD PRESSURE: 85 MMHG | OXYGEN SATURATION: 97 % | HEIGHT: 72 IN | WEIGHT: 257 LBS | HEART RATE: 94 BPM | SYSTOLIC BLOOD PRESSURE: 146 MMHG | BODY MASS INDEX: 34.81 KG/M2 | RESPIRATION RATE: 18 BRPM | TEMPERATURE: 99 F

## 2021-01-01 DIAGNOSIS — J32.9 SINUSITIS, UNSPECIFIED CHRONICITY, UNSPECIFIED LOCATION: ICD-10-CM

## 2021-01-01 DIAGNOSIS — R05.9 COUGH: Primary | ICD-10-CM

## 2021-01-01 LAB
CTP QC/QA: YES
SARS-COV-2 RDRP RESP QL NAA+PROBE: NEGATIVE

## 2021-01-01 PROCEDURE — 3008F BODY MASS INDEX DOCD: CPT | Mod: CPTII,S$GLB,, | Performed by: NURSE PRACTITIONER

## 2021-01-01 PROCEDURE — 99214 PR OFFICE/OUTPT VISIT, EST, LEVL IV, 30-39 MIN: ICD-10-PCS | Mod: S$GLB,,, | Performed by: NURSE PRACTITIONER

## 2021-01-01 PROCEDURE — U0002: ICD-10-PCS | Mod: QW,S$GLB,, | Performed by: NURSE PRACTITIONER

## 2021-01-01 PROCEDURE — 1126F PR PAIN SEVERITY QUANTIFIED, NO PAIN PRESENT: ICD-10-PCS | Mod: S$GLB,,, | Performed by: NURSE PRACTITIONER

## 2021-01-01 PROCEDURE — U0002 COVID-19 LAB TEST NON-CDC: HCPCS | Mod: QW,S$GLB,, | Performed by: NURSE PRACTITIONER

## 2021-01-01 PROCEDURE — 3008F PR BODY MASS INDEX (BMI) DOCUMENTED: ICD-10-PCS | Mod: CPTII,S$GLB,, | Performed by: NURSE PRACTITIONER

## 2021-01-01 PROCEDURE — 1126F AMNT PAIN NOTED NONE PRSNT: CPT | Mod: S$GLB,,, | Performed by: NURSE PRACTITIONER

## 2021-01-01 PROCEDURE — 99214 OFFICE O/P EST MOD 30 MIN: CPT | Mod: S$GLB,,, | Performed by: NURSE PRACTITIONER

## 2021-01-01 RX ORDER — AZITHROMYCIN 250 MG/1
TABLET, FILM COATED ORAL
Qty: 6 TABLET | Refills: 0 | Status: SHIPPED | OUTPATIENT
Start: 2021-01-01 | End: 2021-10-05 | Stop reason: ALTCHOICE

## 2021-01-01 RX ORDER — BENZONATATE 100 MG/1
CAPSULE ORAL
Qty: 30 CAPSULE | Refills: 1 | Status: SHIPPED | OUTPATIENT
Start: 2021-01-01 | End: 2021-10-05 | Stop reason: ALTCHOICE

## 2021-01-12 NOTE — PROGRESS NOTES
Subjective:       Patient ID: Jl Haynes is a 38 y.o. male.    Chief Complaint: Testicle Pain    Jl Haynes is a 38 y.o. Male with chronic left testicle pain.  He states this pain has been going on for years.  02/06/2012 he had u/s of scrotum that only revealed a few small prominent vessels in the left hemiscrotum, many of which do not fill with color.   He was seen by Dr. Henderson on 09/02/2014.     It is a constant pain; unrelieved with NSAIDS and scrotal support.  I saw him 12/14/2018 for testicle pain as well as post void dribbling.   Rx for Daypro/Flomax    Today, he reports his urinary issues improved; decreased of post void dribbling/  He pleased with how he urinates.   Denies any coffee teas, sodas.  He drinks water;   He works off shore=    Still has some mild issues with ED. They are not as firm as before.  He interested in some assistance.                           PSA                      0.72                05/28/2018                        Past Medical History:  No date: Depression  No date: Hyperlipidemia    History reviewed. No pertinent surgical history.    Review of patient's family history indicates:  Problem: Diabetes      Relation: Mother          Age of Onset: (Not Specified)  Problem: Hypertension      Relation: Mother          Age of Onset: (Not Specified)  Problem: Hypertension      Relation: Father          Age of Onset: (Not Specified)  Problem: Prostate cancer      Relation: Neg Hx          Age of Onset: (Not Specified)  Problem: Kidney disease      Relation: Neg Hx          Age of Onset: (Not Specified)      Social History    Socioeconomic History      Marital status: Single      Spouse name: Not on file      Number of children: Not on file      Years of education: Not on file      Highest education level: Not on file    Social Needs      Financial resource strain: Not on file      Food insecurity - worry: Not on file      Food insecurity - inability: Not on file      Transportation  needs - medical: Not on file      Transportation needs - non-medical: Not on file    Occupational History      Not on file    Tobacco Use      Smoking status: Current Some Day Smoker    Substance and Sexual Activity      Alcohol use: Yes        Comment: Rare social occasions.      Drug use: No      Sexual activity: Not Currently    Other Topics      Concerns:        Not on file    Social History Narrative      Not on file      Allergies:  Penicillins    Medications:  Current Outpatient Medications:   hydroCHLOROthiazide (MICROZIDE) 12.5 mg capsule, Take 1 capsule (12.5 mg total) by mouth once daily., Disp: 90 capsule, Rfl: 2  cholecalciferol, vitamin D3, 50,000 unit capsule, Volodymyr 1 tableta por semana x 12 semanas, Disp: 12 capsule, Rfl: 0  losartan (COZAAR) 50 MG tablet, Take 1 tablet (50 mg total) by mouth once daily., Disp: 90 tablet, Rfl: 3    valacyclovir (VALTREX) 1000 MG tablet, Take 1 tablet (1,000 mg total) by mouth 2 (two) times daily., Disp: 14 tablet, Rfl: 2            Review of Systems   Constitutional: Negative for activity change, appetite change, chills and fever.   HENT: Negative for facial swelling and trouble swallowing.    Eyes: Negative for visual disturbance.   Respiratory: Negative for chest tightness and shortness of breath.    Cardiovascular: Negative for chest pain and palpitations.   Gastrointestinal: Negative.  Negative for abdominal pain, constipation, diarrhea, nausea and vomiting.   Genitourinary: Positive for testicular pain. Negative for difficulty urinating, dysuria, flank pain, hematuria, penile pain, penile swelling and scrotal swelling.        Ok with urination  Chronic left testicle pain; present all the time.     Musculoskeletal: Negative for back pain, gait problem, myalgias and neck stiffness.   Skin: Negative for rash.   Neurological: Negative for dizziness and speech difficulty.   Hematological: Does not bruise/bleed easily.   Psychiatric/Behavioral: Negative for  behavioral problems.       Objective:      Physical Exam   Nursing note and vitals reviewed.  Constitutional: He is oriented to person, place, and time. Vital signs are normal. He appears well-developed and well-nourished. He is active and cooperative.  Non-toxic appearance. He does not have a sickly appearance.   Urine dipped clear of infection in the office today.     HENT:   Head: Normocephalic and atraumatic.   Right Ear: External ear normal.   Left Ear: External ear normal.   Nose: Nose normal.   Mouth/Throat: Mucous membranes are normal.   Eyes: Conjunctivae and lids are normal. No scleral icterus.   Neck: Trachea normal, normal range of motion and full passive range of motion without pain. Neck supple. No JVD present. No tracheal deviation present.   Cardiovascular: Normal rate, S1 normal and S2 normal.    Pulmonary/Chest: Effort normal. No respiratory distress. He exhibits no tenderness.   Abdominal: Soft. Normal appearance and bowel sounds are normal. There is no hepatosplenomegaly. There is no tenderness. There is no CVA tenderness.   Genitourinary: Penis normal. Right testis shows no mass, no swelling and no tenderness. Left testis shows tenderness. Left testis shows no mass and no swelling. Uncircumcised. No phimosis, paraphimosis, hypospadias, penile erythema or penile tenderness. No discharge found.   Genitourinary Comments: Testes in good lie position.  No nodules noted; some tenderness with cremasteric reflex.      Musculoskeletal: Normal range of motion.   Lymphadenopathy: No inguinal adenopathy noted on the right or left side.   Neurological: He is alert and oriented to person, place, and time. He has normal strength.   Skin: Skin is warm, dry and intact.     Psychiatric: He has a normal mood and affect. His behavior is normal. Judgment and thought content normal.       Assessment:       1. Chronic pain in testicle    2. Erectile dysfunction due to arterial insufficiency        Plan:         I  spent 25 minutes with the patient of which more than half was spent in direct consultation with the patient in regards to our treatment and plan.    Education and recommendations of today's plan of care including home remedies.  We discussed his chronic testicle pain; discussed that we have tried NSAIDS, scrotal support and still no real improvement  [facility] due to chronic testicle pain.  Continue NSAIDS;   Start amitriptyline 10mg in the evening; reviewed the benefits, risks, se; discussed we starting a very low dose; can titrate if needed but will discuss first.  We discussed his ED and contributory factors; discussed HTN/meds effects on ED.  Discussed Rx for trial of Revatio; benefits, risks, se; dose titration.   Voiced understanding  Will check on him next week.     no concerns

## 2021-04-16 ENCOUNTER — PATIENT MESSAGE (OUTPATIENT)
Dept: RESEARCH | Facility: HOSPITAL | Age: 41
End: 2021-04-16

## 2021-05-11 ENCOUNTER — LAB VISIT (OUTPATIENT)
Dept: URGENT CARE | Facility: CLINIC | Age: 41
End: 2021-05-11
Payer: COMMERCIAL

## 2021-05-11 DIAGNOSIS — Z20.822 ENCOUNTER FOR LABORATORY TESTING FOR COVID-19 VIRUS: ICD-10-CM

## 2021-05-11 LAB — SARS-COV-2 RNA RESP QL NAA+PROBE: NOT DETECTED

## 2021-05-11 PROCEDURE — U0003 INFECTIOUS AGENT DETECTION BY NUCLEIC ACID (DNA OR RNA); SEVERE ACUTE RESPIRATORY SYNDROME CORONAVIRUS 2 (SARS-COV-2) (CORONAVIRUS DISEASE [COVID-19]), AMPLIFIED PROBE TECHNIQUE, MAKING USE OF HIGH THROUGHPUT TECHNOLOGIES AS DESCRIBED BY CMS-2020-01-R: HCPCS | Performed by: EMERGENCY MEDICINE

## 2021-08-24 ENCOUNTER — TELEPHONE (OUTPATIENT)
Dept: FAMILY MEDICINE | Facility: CLINIC | Age: 41
End: 2021-08-24

## 2021-10-05 ENCOUNTER — OFFICE VISIT (OUTPATIENT)
Dept: INTERNAL MEDICINE | Facility: CLINIC | Age: 41
End: 2021-10-05
Payer: COMMERCIAL

## 2021-10-05 ENCOUNTER — HOSPITAL ENCOUNTER (OUTPATIENT)
Dept: RADIOLOGY | Facility: HOSPITAL | Age: 41
Discharge: HOME | End: 2021-10-05
Attending: NURSE PRACTITIONER
Payer: COMMERCIAL

## 2021-10-05 VITALS
SYSTOLIC BLOOD PRESSURE: 130 MMHG | WEIGHT: 225.5 LBS | HEART RATE: 77 BPM | DIASTOLIC BLOOD PRESSURE: 90 MMHG | OXYGEN SATURATION: 97 % | TEMPERATURE: 98 F | HEIGHT: 71 IN | BODY MASS INDEX: 31.57 KG/M2

## 2021-10-05 DIAGNOSIS — Z11.59 ENCOUNTER FOR HEPATITIS C SCREENING TEST FOR LOW RISK PATIENT: ICD-10-CM

## 2021-10-05 DIAGNOSIS — E55.9 VITAMIN D DEFICIENCY: ICD-10-CM

## 2021-10-05 DIAGNOSIS — E78.2 MIXED HYPERLIPIDEMIA: ICD-10-CM

## 2021-10-05 DIAGNOSIS — Z11.4 SCREENING FOR HIV (HUMAN IMMUNODEFICIENCY VIRUS): ICD-10-CM

## 2021-10-05 DIAGNOSIS — G43.909 MIGRAINE WITHOUT STATUS MIGRAINOSUS, NOT INTRACTABLE, UNSPECIFIED MIGRAINE TYPE: ICD-10-CM

## 2021-10-05 DIAGNOSIS — R06.02 SOB (SHORTNESS OF BREATH): ICD-10-CM

## 2021-10-05 DIAGNOSIS — R63.4 WEIGHT LOSS: ICD-10-CM

## 2021-10-05 DIAGNOSIS — Z12.5 SCREENING FOR PROSTATE CANCER: ICD-10-CM

## 2021-10-05 DIAGNOSIS — F32.A DEPRESSION, UNSPECIFIED DEPRESSION TYPE: ICD-10-CM

## 2021-10-05 DIAGNOSIS — R68.82 LOW LIBIDO: ICD-10-CM

## 2021-10-05 DIAGNOSIS — N40.0 ENLARGED PROSTATE: ICD-10-CM

## 2021-10-05 DIAGNOSIS — R55 SYNCOPE, UNSPECIFIED SYNCOPE TYPE: ICD-10-CM

## 2021-10-05 DIAGNOSIS — R79.89 ELEVATED LFTS: ICD-10-CM

## 2021-10-05 DIAGNOSIS — R73.03 PRE-DIABETES: ICD-10-CM

## 2021-10-05 DIAGNOSIS — R53.83 FATIGUE, UNSPECIFIED TYPE: ICD-10-CM

## 2021-10-05 DIAGNOSIS — Z00.00 ANNUAL PHYSICAL EXAM: Primary | ICD-10-CM

## 2021-10-05 DIAGNOSIS — R63.1 INCREASED THIRST: ICD-10-CM

## 2021-10-05 DIAGNOSIS — I10 ESSENTIAL HYPERTENSION: ICD-10-CM

## 2021-10-05 DIAGNOSIS — E78.00 HYPERCHOLESTEROLEMIA: ICD-10-CM

## 2021-10-05 DIAGNOSIS — Z23 NEED FOR VACCINATION: ICD-10-CM

## 2021-10-05 DIAGNOSIS — Z00.00 ANNUAL PHYSICAL EXAM: ICD-10-CM

## 2021-10-05 PROCEDURE — 99999 PR PBB SHADOW E&M-EST. PATIENT-LVL III: ICD-10-PCS | Mod: PBBFAC,,, | Performed by: NURSE PRACTITIONER

## 2021-10-05 PROCEDURE — 99396 PR PREVENTIVE VISIT,EST,40-64: ICD-10-PCS | Mod: 25,S$GLB,, | Performed by: NURSE PRACTITIONER

## 2021-10-05 PROCEDURE — 99396 PREV VISIT EST AGE 40-64: CPT | Mod: 25,S$GLB,, | Performed by: NURSE PRACTITIONER

## 2021-10-05 PROCEDURE — 90686 FLU VACCINE (QUAD) GREATER THAN OR EQUAL TO 3YO PRESERVATIVE FREE IM: ICD-10-PCS | Mod: S$GLB,,, | Performed by: NURSE PRACTITIONER

## 2021-10-05 PROCEDURE — 71046 XR CHEST PA AND LATERAL: ICD-10-PCS | Mod: 26,,, | Performed by: RADIOLOGY

## 2021-10-05 PROCEDURE — 90471 IMMUNIZATION ADMIN: CPT | Mod: S$GLB,,, | Performed by: NURSE PRACTITIONER

## 2021-10-05 PROCEDURE — 71046 X-RAY EXAM CHEST 2 VIEWS: CPT | Mod: 26,,, | Performed by: RADIOLOGY

## 2021-10-05 PROCEDURE — 99999 PR PBB SHADOW E&M-EST. PATIENT-LVL III: CPT | Mod: PBBFAC,,, | Performed by: NURSE PRACTITIONER

## 2021-10-05 PROCEDURE — 90686 IIV4 VACC NO PRSV 0.5 ML IM: CPT | Mod: S$GLB,,, | Performed by: NURSE PRACTITIONER

## 2021-10-05 PROCEDURE — 71046 X-RAY EXAM CHEST 2 VIEWS: CPT | Mod: TC,PO

## 2021-10-05 PROCEDURE — 90471 FLU VACCINE (QUAD) GREATER THAN OR EQUAL TO 3YO PRESERVATIVE FREE IM: ICD-10-PCS | Mod: S$GLB,,, | Performed by: NURSE PRACTITIONER

## 2021-10-05 RX ORDER — LOSARTAN POTASSIUM 100 MG/1
100 TABLET ORAL DAILY
Qty: 90 TABLET | Refills: 3 | Status: SHIPPED | OUTPATIENT
Start: 2021-10-05 | End: 2021-11-05 | Stop reason: SDUPTHER

## 2021-10-06 ENCOUNTER — HOSPITAL ENCOUNTER (EMERGENCY)
Facility: HOSPITAL | Age: 41
Discharge: HOME | End: 2021-10-06
Attending: EMERGENCY MEDICINE
Payer: COMMERCIAL

## 2021-10-06 ENCOUNTER — PATIENT MESSAGE (OUTPATIENT)
Dept: INTERNAL MEDICINE | Facility: CLINIC | Age: 41
End: 2021-10-06

## 2021-10-06 VITALS
DIASTOLIC BLOOD PRESSURE: 81 MMHG | TEMPERATURE: 98 F | OXYGEN SATURATION: 99 % | RESPIRATION RATE: 17 BRPM | SYSTOLIC BLOOD PRESSURE: 124 MMHG | HEART RATE: 89 BPM

## 2021-10-06 DIAGNOSIS — E11.65 TYPE 2 DIABETES MELLITUS WITH HYPERGLYCEMIA, WITHOUT LONG-TERM CURRENT USE OF INSULIN: Primary | ICD-10-CM

## 2021-10-06 DIAGNOSIS — R51.9 NONINTRACTABLE HEADACHE, UNSPECIFIED CHRONICITY PATTERN, UNSPECIFIED HEADACHE TYPE: ICD-10-CM

## 2021-10-06 LAB
ALBUMIN SERPL BCP-MCNC: 4.6 G/DL (ref 3.5–5.2)
ALP SERPL-CCNC: 90 U/L (ref 55–135)
ALT SERPL W/O P-5'-P-CCNC: 39 U/L (ref 10–44)
ANION GAP SERPL CALC-SCNC: 12 MMOL/L (ref 8–16)
AST SERPL-CCNC: 19 U/L (ref 10–40)
B-OH-BUTYR BLD STRIP-SCNC: 0.4 MMOL/L (ref 0–0.5)
BACTERIA #/AREA URNS HPF: ABNORMAL /HPF
BASOPHILS # BLD AUTO: 0.04 K/UL (ref 0–0.2)
BASOPHILS NFR BLD: 0.5 % (ref 0–1.9)
BILIRUB SERPL-MCNC: 1 MG/DL (ref 0.1–1)
BILIRUB UR QL STRIP: NEGATIVE
BUN SERPL-MCNC: 12 MG/DL (ref 6–20)
CALCIUM SERPL-MCNC: 10.4 MG/DL (ref 8.7–10.5)
CHLORIDE SERPL-SCNC: 102 MMOL/L (ref 95–110)
CLARITY UR: CLEAR
CO2 SERPL-SCNC: 23 MMOL/L (ref 23–29)
COLOR UR: YELLOW
CREAT SERPL-MCNC: 1.3 MG/DL (ref 0.5–1.4)
DIFFERENTIAL METHOD: ABNORMAL
EOSINOPHIL # BLD AUTO: 0.1 K/UL (ref 0–0.5)
EOSINOPHIL NFR BLD: 1 % (ref 0–8)
ERYTHROCYTE [DISTWIDTH] IN BLOOD BY AUTOMATED COUNT: 13 % (ref 11.5–14.5)
EST. GFR  (AFRICAN AMERICAN): >60 ML/MIN/1.73 M^2
EST. GFR  (NON AFRICAN AMERICAN): >60 ML/MIN/1.73 M^2
GLUCOSE SERPL-MCNC: 245 MG/DL (ref 70–110)
GLUCOSE UR QL STRIP: ABNORMAL
HCT VFR BLD AUTO: 45.1 % (ref 40–54)
HGB BLD-MCNC: 15.4 G/DL (ref 14–18)
HGB UR QL STRIP: NEGATIVE
HYALINE CASTS #/AREA URNS LPF: 2 /LPF
IMM GRANULOCYTES # BLD AUTO: 0.05 K/UL (ref 0–0.04)
IMM GRANULOCYTES NFR BLD AUTO: 0.7 % (ref 0–0.5)
KETONES UR QL STRIP: ABNORMAL
LEUKOCYTE ESTERASE UR QL STRIP: NEGATIVE
LYMPHOCYTES # BLD AUTO: 2.3 K/UL (ref 1–4.8)
LYMPHOCYTES NFR BLD: 31.8 % (ref 18–48)
MCH RBC QN AUTO: 28.9 PG (ref 27–31)
MCHC RBC AUTO-ENTMCNC: 34.1 G/DL (ref 32–36)
MCV RBC AUTO: 85 FL (ref 82–98)
MICROSCOPIC COMMENT: ABNORMAL
MONOCYTES # BLD AUTO: 0.7 K/UL (ref 0.3–1)
MONOCYTES NFR BLD: 9.8 % (ref 4–15)
NEUTROPHILS # BLD AUTO: 4.1 K/UL (ref 1.8–7.7)
NEUTROPHILS NFR BLD: 56.2 % (ref 38–73)
NITRITE UR QL STRIP: NEGATIVE
NRBC BLD-RTO: 0 /100 WBC
PH UR STRIP: 5 [PH] (ref 5–8)
PLATELET # BLD AUTO: 219 K/UL (ref 150–450)
PMV BLD AUTO: 9.1 FL (ref 9.2–12.9)
POCT GLUCOSE: 220 MG/DL (ref 70–110)
POCT GLUCOSE: 242 MG/DL (ref 70–110)
POTASSIUM SERPL-SCNC: 4.2 MMOL/L (ref 3.5–5.1)
PROT SERPL-MCNC: 8 G/DL (ref 6–8.4)
PROT UR QL STRIP: NEGATIVE
RBC # BLD AUTO: 5.32 M/UL (ref 4.6–6.2)
RBC #/AREA URNS HPF: 0 /HPF (ref 0–4)
SODIUM SERPL-SCNC: 137 MMOL/L (ref 136–145)
SP GR UR STRIP: 1.01 (ref 1–1.03)
SQUAMOUS #/AREA URNS HPF: 0 /HPF
URN SPEC COLLECT METH UR: ABNORMAL
UROBILINOGEN UR STRIP-ACNC: NEGATIVE EU/DL
WBC # BLD AUTO: 7.36 K/UL (ref 3.9–12.7)
WBC #/AREA URNS HPF: 1 /HPF (ref 0–5)
YEAST URNS QL MICRO: ABNORMAL

## 2021-10-06 PROCEDURE — 25000003 PHARM REV CODE 250: Performed by: NURSE PRACTITIONER

## 2021-10-06 PROCEDURE — 85025 COMPLETE CBC W/AUTO DIFF WBC: CPT | Performed by: NURSE PRACTITIONER

## 2021-10-06 PROCEDURE — 81000 URINALYSIS NONAUTO W/SCOPE: CPT | Performed by: NURSE PRACTITIONER

## 2021-10-06 PROCEDURE — 82010 KETONE BODYS QUAN: CPT | Performed by: NURSE PRACTITIONER

## 2021-10-06 PROCEDURE — 96361 HYDRATE IV INFUSION ADD-ON: CPT

## 2021-10-06 PROCEDURE — 80053 COMPREHEN METABOLIC PANEL: CPT | Performed by: NURSE PRACTITIONER

## 2021-10-06 PROCEDURE — 99284 EMERGENCY DEPT VISIT MOD MDM: CPT | Mod: 25

## 2021-10-06 PROCEDURE — 96360 HYDRATION IV INFUSION INIT: CPT

## 2021-10-06 PROCEDURE — 82962 GLUCOSE BLOOD TEST: CPT

## 2021-10-06 RX ORDER — ACETAMINOPHEN 325 MG/1
650 TABLET ORAL
Status: COMPLETED | OUTPATIENT
Start: 2021-10-06 | End: 2021-10-06

## 2021-10-06 RX ORDER — METFORMIN HYDROCHLORIDE 500 MG/1
500 TABLET ORAL 2 TIMES DAILY WITH MEALS
Qty: 60 TABLET | Refills: 0 | Status: SHIPPED | OUTPATIENT
Start: 2021-10-06 | End: 2021-10-25 | Stop reason: SDUPTHER

## 2021-10-06 RX ORDER — METFORMIN HYDROCHLORIDE 500 MG/1
500 TABLET ORAL ONCE
Status: COMPLETED | OUTPATIENT
Start: 2021-10-06 | End: 2021-10-06

## 2021-10-06 RX ADMIN — METFORMIN HYDROCHLORIDE 500 MG: 500 TABLET ORAL at 04:10

## 2021-10-06 RX ADMIN — ACETAMINOPHEN 650 MG: 325 TABLET ORAL at 11:10

## 2021-10-06 RX ADMIN — SODIUM CHLORIDE 1000 ML: 0.9 INJECTION, SOLUTION INTRAVENOUS at 01:10

## 2021-10-08 ENCOUNTER — PATIENT MESSAGE (OUTPATIENT)
Dept: INTERNAL MEDICINE | Facility: CLINIC | Age: 41
End: 2021-10-08

## 2021-10-22 ENCOUNTER — PATIENT MESSAGE (OUTPATIENT)
Dept: INTERNAL MEDICINE | Facility: CLINIC | Age: 41
End: 2021-10-22

## 2021-10-22 DIAGNOSIS — E11.9 TYPE 2 DIABETES MELLITUS WITHOUT COMPLICATION, WITHOUT LONG-TERM CURRENT USE OF INSULIN: Primary | ICD-10-CM

## 2021-10-25 DIAGNOSIS — E11.9 TYPE 2 DIABETES MELLITUS WITHOUT COMPLICATION, WITHOUT LONG-TERM CURRENT USE OF INSULIN: ICD-10-CM

## 2021-10-25 RX ORDER — METFORMIN HYDROCHLORIDE 500 MG/1
500 TABLET ORAL 2 TIMES DAILY WITH MEALS
Qty: 180 TABLET | Refills: 1 | Status: SHIPPED | OUTPATIENT
Start: 2021-10-25 | End: 2021-10-25

## 2021-10-25 RX ORDER — METFORMIN HYDROCHLORIDE 500 MG/1
500 TABLET ORAL 2 TIMES DAILY WITH MEALS
Qty: 180 TABLET | Refills: 1 | Status: SHIPPED | OUTPATIENT
Start: 2021-10-25 | End: 2021-10-27 | Stop reason: SDUPTHER

## 2021-10-27 DIAGNOSIS — E11.9 TYPE 2 DIABETES MELLITUS WITHOUT COMPLICATION, WITHOUT LONG-TERM CURRENT USE OF INSULIN: ICD-10-CM

## 2021-10-27 RX ORDER — METFORMIN HYDROCHLORIDE 500 MG/1
500 TABLET ORAL 2 TIMES DAILY WITH MEALS
Qty: 180 TABLET | Refills: 1 | Status: SHIPPED | OUTPATIENT
Start: 2021-10-27 | End: 2021-11-05 | Stop reason: SDUPTHER

## 2021-10-28 DIAGNOSIS — E11.9 TYPE 2 DIABETES MELLITUS WITHOUT COMPLICATION, WITHOUT LONG-TERM CURRENT USE OF INSULIN: ICD-10-CM

## 2021-10-28 RX ORDER — METFORMIN HYDROCHLORIDE 500 MG/1
500 TABLET ORAL 2 TIMES DAILY WITH MEALS
Qty: 180 TABLET | Refills: 1 | Status: CANCELLED | OUTPATIENT
Start: 2021-10-28 | End: 2022-01-26

## 2021-10-29 ENCOUNTER — PATIENT MESSAGE (OUTPATIENT)
Dept: INTERNAL MEDICINE | Facility: CLINIC | Age: 41
End: 2021-10-29

## 2021-11-05 ENCOUNTER — OFFICE VISIT (OUTPATIENT)
Dept: FAMILY MEDICINE | Facility: CLINIC | Age: 41
End: 2021-11-05
Payer: COMMERCIAL

## 2021-11-05 ENCOUNTER — TELEPHONE (OUTPATIENT)
Dept: FAMILY MEDICINE | Facility: CLINIC | Age: 41
End: 2021-11-05

## 2021-11-05 ENCOUNTER — LAB VISIT (OUTPATIENT)
Dept: LAB | Facility: HOSPITAL | Age: 41
End: 2021-11-05
Attending: INTERNAL MEDICINE
Payer: COMMERCIAL

## 2021-11-05 ENCOUNTER — TELEPHONE (OUTPATIENT)
Dept: ADMINISTRATIVE | Facility: HOSPITAL | Age: 41
End: 2021-11-05

## 2021-11-05 VITALS
OXYGEN SATURATION: 99 % | SYSTOLIC BLOOD PRESSURE: 109 MMHG | HEART RATE: 73 BPM | HEIGHT: 71 IN | DIASTOLIC BLOOD PRESSURE: 69 MMHG | TEMPERATURE: 99 F | BODY MASS INDEX: 31.32 KG/M2 | WEIGHT: 223.75 LBS

## 2021-11-05 DIAGNOSIS — E78.2 MIXED HYPERLIPIDEMIA: ICD-10-CM

## 2021-11-05 DIAGNOSIS — I10 ESSENTIAL HYPERTENSION: ICD-10-CM

## 2021-11-05 DIAGNOSIS — B35.3 TINEA PEDIS OF BOTH FEET: ICD-10-CM

## 2021-11-05 DIAGNOSIS — E11.00 TYPE 2 DIABETES MELLITUS WITH HYPEROSMOLARITY WITHOUT COMA, WITHOUT LONG-TERM CURRENT USE OF INSULIN: ICD-10-CM

## 2021-11-05 DIAGNOSIS — E11.00 TYPE 2 DIABETES MELLITUS WITH HYPEROSMOLARITY WITHOUT COMA, WITHOUT LONG-TERM CURRENT USE OF INSULIN: Primary | ICD-10-CM

## 2021-11-05 LAB
ALBUMIN SERPL BCP-MCNC: 4.5 G/DL (ref 3.5–5.2)
ALP SERPL-CCNC: 60 U/L (ref 55–135)
ALT SERPL W/O P-5'-P-CCNC: 38 U/L (ref 10–44)
ANION GAP SERPL CALC-SCNC: 9 MMOL/L (ref 8–16)
AST SERPL-CCNC: 25 U/L (ref 10–40)
BILIRUB SERPL-MCNC: 0.8 MG/DL (ref 0.1–1)
BUN SERPL-MCNC: 12 MG/DL (ref 6–20)
C PEPTIDE SERPL-MCNC: 2.35 NG/ML (ref 0.78–5.19)
CALCIUM SERPL-MCNC: 9.8 MG/DL (ref 8.7–10.5)
CHLORIDE SERPL-SCNC: 105 MMOL/L (ref 95–110)
CHOLEST SERPL-MCNC: 188 MG/DL (ref 120–199)
CHOLEST/HDLC SERPL: 5.2 {RATIO} (ref 2–5)
CO2 SERPL-SCNC: 25 MMOL/L (ref 23–29)
CREAT SERPL-MCNC: 1.1 MG/DL (ref 0.5–1.4)
EST. GFR  (AFRICAN AMERICAN): >60 ML/MIN/1.73 M^2
EST. GFR  (NON AFRICAN AMERICAN): >60 ML/MIN/1.73 M^2
ESTIMATED AVG GLUCOSE: 258 MG/DL (ref 68–131)
GLUCOSE SERPL-MCNC: 118 MG/DL (ref 70–110)
HBA1C MFR BLD: 10.6 % (ref 4–5.6)
HDLC SERPL-MCNC: 36 MG/DL (ref 40–75)
HDLC SERPL: 19.1 % (ref 20–50)
LDLC SERPL CALC-MCNC: 132 MG/DL (ref 63–159)
NONHDLC SERPL-MCNC: 152 MG/DL
POTASSIUM SERPL-SCNC: 4.3 MMOL/L (ref 3.5–5.1)
PROT SERPL-MCNC: 7.7 G/DL (ref 6–8.4)
SODIUM SERPL-SCNC: 139 MMOL/L (ref 136–145)
TRIGL SERPL-MCNC: 100 MG/DL (ref 30–150)

## 2021-11-05 PROCEDURE — 99999 PR PBB SHADOW E&M-EST. PATIENT-LVL IV: CPT | Mod: PBBFAC,,, | Performed by: INTERNAL MEDICINE

## 2021-11-05 PROCEDURE — 36415 COLL VENOUS BLD VENIPUNCTURE: CPT | Performed by: INTERNAL MEDICINE

## 2021-11-05 PROCEDURE — 99214 OFFICE O/P EST MOD 30 MIN: CPT | Mod: 25,S$GLB,, | Performed by: INTERNAL MEDICINE

## 2021-11-05 PROCEDURE — 83036 HEMOGLOBIN GLYCOSYLATED A1C: CPT | Performed by: INTERNAL MEDICINE

## 2021-11-05 PROCEDURE — 80061 LIPID PANEL: CPT | Performed by: INTERNAL MEDICINE

## 2021-11-05 PROCEDURE — 84681 ASSAY OF C-PEPTIDE: CPT | Performed by: INTERNAL MEDICINE

## 2021-11-05 PROCEDURE — 90471 TDAP VACCINE GREATER THAN OR EQUAL TO 7YO IM: ICD-10-PCS | Mod: S$GLB,,, | Performed by: INTERNAL MEDICINE

## 2021-11-05 PROCEDURE — 90715 TDAP VACCINE 7 YRS/> IM: CPT | Mod: S$GLB,,, | Performed by: INTERNAL MEDICINE

## 2021-11-05 PROCEDURE — 90471 IMMUNIZATION ADMIN: CPT | Mod: S$GLB,,, | Performed by: INTERNAL MEDICINE

## 2021-11-05 PROCEDURE — 80053 COMPREHEN METABOLIC PANEL: CPT | Performed by: INTERNAL MEDICINE

## 2021-11-05 PROCEDURE — 90715 TDAP VACCINE GREATER THAN OR EQUAL TO 7YO IM: ICD-10-PCS | Mod: S$GLB,,, | Performed by: INTERNAL MEDICINE

## 2021-11-05 PROCEDURE — 99214 PR OFFICE/OUTPT VISIT, EST, LEVL IV, 30-39 MIN: ICD-10-PCS | Mod: 25,S$GLB,, | Performed by: INTERNAL MEDICINE

## 2021-11-05 PROCEDURE — 99999 PR PBB SHADOW E&M-EST. PATIENT-LVL IV: ICD-10-PCS | Mod: PBBFAC,,, | Performed by: INTERNAL MEDICINE

## 2021-11-05 RX ORDER — LOSARTAN POTASSIUM 100 MG/1
100 TABLET ORAL DAILY
Qty: 90 TABLET | Refills: 3 | Status: SHIPPED | OUTPATIENT
Start: 2021-11-05 | End: 2022-08-02 | Stop reason: SDUPTHER

## 2021-11-05 RX ORDER — CLOTRIMAZOLE 1 %
CREAM (GRAM) TOPICAL 2 TIMES DAILY
Qty: 60 G | Refills: 0 | Status: SHIPPED | OUTPATIENT
Start: 2021-11-05 | End: 2022-08-05

## 2021-11-05 RX ORDER — PRAVASTATIN SODIUM 20 MG/1
20 TABLET ORAL DAILY
Qty: 90 TABLET | Refills: 3 | Status: SHIPPED | OUTPATIENT
Start: 2021-11-05 | End: 2022-08-02 | Stop reason: SDUPTHER

## 2021-11-05 RX ORDER — METFORMIN HYDROCHLORIDE 500 MG/1
1000 TABLET ORAL 2 TIMES DAILY WITH MEALS
Qty: 180 TABLET | Refills: 3 | OUTPATIENT
Start: 2021-11-05 | End: 2022-02-07 | Stop reason: SDUPTHER

## 2021-11-08 ENCOUNTER — TELEPHONE (OUTPATIENT)
Dept: FAMILY MEDICINE | Facility: CLINIC | Age: 41
End: 2021-11-08

## 2021-11-10 ENCOUNTER — PATIENT MESSAGE (OUTPATIENT)
Dept: FAMILY MEDICINE | Facility: CLINIC | Age: 41
End: 2021-11-10

## 2022-01-05 ENCOUNTER — TELEPHONE (OUTPATIENT)
Dept: ADMINISTRATIVE | Facility: HOSPITAL | Age: 42
End: 2022-01-05
Payer: COMMERCIAL

## 2022-02-03 ENCOUNTER — LAB VISIT (OUTPATIENT)
Dept: LAB | Facility: HOSPITAL | Age: 42
End: 2022-02-03
Attending: INTERNAL MEDICINE
Payer: COMMERCIAL

## 2022-02-03 ENCOUNTER — OFFICE VISIT (OUTPATIENT)
Dept: FAMILY MEDICINE | Facility: CLINIC | Age: 42
End: 2022-02-03
Payer: COMMERCIAL

## 2022-02-03 VITALS
HEART RATE: 67 BPM | SYSTOLIC BLOOD PRESSURE: 128 MMHG | HEIGHT: 71 IN | OXYGEN SATURATION: 99 % | DIASTOLIC BLOOD PRESSURE: 82 MMHG | WEIGHT: 233.69 LBS | BODY MASS INDEX: 32.72 KG/M2

## 2022-02-03 DIAGNOSIS — K59.00 CONSTIPATION, UNSPECIFIED CONSTIPATION TYPE: ICD-10-CM

## 2022-02-03 DIAGNOSIS — E78.2 MIXED HYPERLIPIDEMIA: ICD-10-CM

## 2022-02-03 DIAGNOSIS — I10 ESSENTIAL HYPERTENSION: ICD-10-CM

## 2022-02-03 DIAGNOSIS — E55.9 VITAMIN D DEFICIENCY: ICD-10-CM

## 2022-02-03 DIAGNOSIS — E11.00 TYPE 2 DIABETES MELLITUS WITH HYPEROSMOLARITY WITHOUT COMA, WITHOUT LONG-TERM CURRENT USE OF INSULIN: Primary | ICD-10-CM

## 2022-02-03 DIAGNOSIS — E11.00 TYPE 2 DIABETES MELLITUS WITH HYPEROSMOLARITY WITHOUT COMA, WITHOUT LONG-TERM CURRENT USE OF INSULIN: ICD-10-CM

## 2022-02-03 LAB
ALBUMIN SERPL BCP-MCNC: 4.5 G/DL (ref 3.5–5.2)
ALBUMIN SERPL BCP-MCNC: 4.5 G/DL (ref 3.5–5.2)
ALP SERPL-CCNC: 65 U/L (ref 55–135)
ALP SERPL-CCNC: 65 U/L (ref 55–135)
ALT SERPL W/O P-5'-P-CCNC: 21 U/L (ref 10–44)
ALT SERPL W/O P-5'-P-CCNC: 21 U/L (ref 10–44)
ANION GAP SERPL CALC-SCNC: 5 MMOL/L (ref 8–16)
ANION GAP SERPL CALC-SCNC: 5 MMOL/L (ref 8–16)
AST SERPL-CCNC: 16 U/L (ref 10–40)
AST SERPL-CCNC: 16 U/L (ref 10–40)
BILIRUB SERPL-MCNC: 0.6 MG/DL (ref 0.1–1)
BILIRUB SERPL-MCNC: 0.6 MG/DL (ref 0.1–1)
BUN SERPL-MCNC: 9 MG/DL (ref 6–20)
BUN SERPL-MCNC: 9 MG/DL (ref 6–20)
CALCIUM SERPL-MCNC: 10.2 MG/DL (ref 8.7–10.5)
CALCIUM SERPL-MCNC: 10.2 MG/DL (ref 8.7–10.5)
CHLORIDE SERPL-SCNC: 106 MMOL/L (ref 95–110)
CHLORIDE SERPL-SCNC: 106 MMOL/L (ref 95–110)
CO2 SERPL-SCNC: 30 MMOL/L (ref 23–29)
CO2 SERPL-SCNC: 30 MMOL/L (ref 23–29)
CREAT SERPL-MCNC: 1.1 MG/DL (ref 0.5–1.4)
CREAT SERPL-MCNC: 1.1 MG/DL (ref 0.5–1.4)
EST. GFR  (AFRICAN AMERICAN): >60 ML/MIN/1.73 M^2
EST. GFR  (AFRICAN AMERICAN): >60 ML/MIN/1.73 M^2
EST. GFR  (NON AFRICAN AMERICAN): >60 ML/MIN/1.73 M^2
EST. GFR  (NON AFRICAN AMERICAN): >60 ML/MIN/1.73 M^2
ESTIMATED AVG GLUCOSE: 140 MG/DL (ref 68–131)
GLUCOSE SERPL-MCNC: 97 MG/DL (ref 70–110)
GLUCOSE SERPL-MCNC: 97 MG/DL (ref 70–110)
HBA1C MFR BLD: 6.5 % (ref 4–5.6)
POTASSIUM SERPL-SCNC: 4 MMOL/L (ref 3.5–5.1)
POTASSIUM SERPL-SCNC: 4 MMOL/L (ref 3.5–5.1)
PROT SERPL-MCNC: 7.6 G/DL (ref 6–8.4)
PROT SERPL-MCNC: 7.6 G/DL (ref 6–8.4)
SODIUM SERPL-SCNC: 141 MMOL/L (ref 136–145)
SODIUM SERPL-SCNC: 141 MMOL/L (ref 136–145)

## 2022-02-03 PROCEDURE — 99999 PR PBB SHADOW E&M-EST. PATIENT-LVL IV: CPT | Mod: PBBFAC,,, | Performed by: INTERNAL MEDICINE

## 2022-02-03 PROCEDURE — 99214 OFFICE O/P EST MOD 30 MIN: CPT | Mod: S$GLB,,, | Performed by: INTERNAL MEDICINE

## 2022-02-03 PROCEDURE — 99999 PR PBB SHADOW E&M-EST. PATIENT-LVL IV: ICD-10-PCS | Mod: PBBFAC,,, | Performed by: INTERNAL MEDICINE

## 2022-02-03 PROCEDURE — 36415 COLL VENOUS BLD VENIPUNCTURE: CPT | Performed by: INTERNAL MEDICINE

## 2022-02-03 PROCEDURE — 83036 HEMOGLOBIN GLYCOSYLATED A1C: CPT | Performed by: INTERNAL MEDICINE

## 2022-02-03 PROCEDURE — 80053 COMPREHEN METABOLIC PANEL: CPT | Performed by: INTERNAL MEDICINE

## 2022-02-03 PROCEDURE — 99214 PR OFFICE/OUTPT VISIT, EST, LEVL IV, 30-39 MIN: ICD-10-PCS | Mod: S$GLB,,, | Performed by: INTERNAL MEDICINE

## 2022-02-03 RX ORDER — DOCUSATE SODIUM 100 MG/1
100 CAPSULE, LIQUID FILLED ORAL 2 TIMES DAILY PRN
Qty: 30 CAPSULE | Refills: 2 | Status: SHIPPED | OUTPATIENT
Start: 2022-02-03 | End: 2022-08-05

## 2022-02-03 RX ORDER — ASPIRIN 325 MG
TABLET, DELAYED RELEASE (ENTERIC COATED) ORAL
Qty: 12 CAPSULE | Refills: 0 | OUTPATIENT
Start: 2022-02-03 | End: 2022-02-07

## 2022-02-03 NOTE — PROGRESS NOTES
Subjective:       Patient ID: Jl Haynes is a 41 y.o. male.    Chief Complaint: Diabetes (3 month )      HPI  Jl Haynes is a 41 y.o. male with chronic conditions of  diabetes mellitus type 2, hypertension, hyperlipidemia, and enlarged prostatewho presents today for follow up.    Diabetes mellitus type 2- has been tolerating the metformin but feels the medication is causing constipation.  Tries to eat healthy but when he is off sure his diet has a lot of carbohydrates.  His Accu-Cheks have been above 150 follow-up work.  His lowest Accu-Chek was 113. His stays active with a very physical job but no routine exercise.  Has gained some weight.  Labs showed a normal C-peptide when the A1c was 10. In the past had occasional elevated fasting blood glucose most suggestive of prediabetes.  Had unintentional weight loss before he was diagnosed with diabetes but no DKA.  He drinks a lot a water and while he works and feels he is urinating normally.    His blood pressure is usually well controlled.  Today has mild elevation of the diastolic.  Denies headaches, chest pains, or palpitations.    Health Maintenance:  Health Maintenance   Topic Date Due    Eye Exam  Never done    Hemoglobin A1c  08/03/2022    Foot Exam  11/05/2022    Lipid Panel  11/05/2022    Low Dose Statin  02/03/2023    TETANUS VACCINE  11/05/2031    Hepatitis C Screening  Completed       Review of Systems   Constitutional: Positive for unexpected weight change (Gaining weight). Negative for chills and fever.   HENT: Negative.    Eyes: Negative for visual disturbance.   Respiratory: Negative for shortness of breath.    Cardiovascular: Negative for chest pain and palpitations.   Gastrointestinal: Positive for change in bowel habit, constipation and change in bowel habit.   Endocrine: Negative for polydipsia and polyuria.   Genitourinary: Negative for difficulty urinating.   Musculoskeletal: Negative.    Neurological: Negative for weakness and  headaches.   Psychiatric/Behavioral: Negative.       Past Medical History:   Diagnosis Date    Depression     Enlarged prostate     Essential hypertension 2021    HTN (hypertension)     Hyperlipidemia     Type 2 diabetes mellitus with hyperosmolarity without coma, without long-term current use of insulin 2021       Past Surgical History:   Procedure Laterality Date    UMBILICAL HERNIA REPAIR         Family History   Problem Relation Age of Onset    Diabetes Mother     Hypertension Mother     Hypertension Father     Diabetes Mellitus Maternal Grandmother     Dementia Maternal Grandmother     Heart attack Maternal Grandfather     Diabetes Mellitus Maternal Uncle     Prostate cancer Neg Hx     Kidney disease Neg Hx        Social History     Socioeconomic History    Marital status:    Tobacco Use    Smoking status: Former Smoker     Quit date:      Years since quittin.0    Smokeless tobacco: Never Used   Substance and Sexual Activity    Alcohol use: Yes     Alcohol/week: 1.0 standard drink     Types: 1 Standard drinks or equivalent per week     Comment: Rare social occasions.    Drug use: Not Currently     Types: Marijuana    Sexual activity: Not Currently       Current Outpatient Medications   Medication Sig Dispense Refill    clotrimazole (LOTRIMIN) 1 % cream Apply topically 2 (two) times daily. 60 g 0    losartan (COZAAR) 100 MG tablet Take 1 tablet (100 mg total) by mouth once daily. 90 tablet 3    metFORMIN (GLUCOPHAGE) 500 MG tablet Take 2 tablets (1,000 mg total) by mouth 2 (two) times daily with meals. 180 tablet 3    pravastatin (PRAVACHOL) 20 MG tablet Take 1 tablet (20 mg total) by mouth once daily. 90 tablet 3    cholecalciferol, vitamin D3, 1,250 mcg (50,000 unit) capsule Volodymyr 1 tableta por semana x 12 semanas 12 capsule 0    docusate sodium (COLACE) 100 MG capsule Take 1 capsule (100 mg total) by mouth 2 (two) times daily as needed for Constipation. 30  capsule 2     No current facility-administered medications for this visit.       Review of patient's allergies indicates:   Allergen Reactions    Penicillins Anaphylaxis         Objective:       Last 3 sets of Vitals    Vitals - 1 value per visit 11/5/2021 2/3/2022 2/3/2022   SYSTOLIC 109 - 128   DIASTOLIC 69 - 82   Pulse - - 67   Temp - - -   Resp - - -   SPO2 - - 99   Weight (lb) - - 233.69   Weight (kg) - - 106   Height - - 71   BMI (Calculated) - - 32.6   VISIT REPORT - - -   Pain Score  - 0 -   Physical Exam  Constitutional:       General: He is not in acute distress.     Appearance: Normal appearance.   HENT:      Head: Normocephalic.   Eyes:      General: No scleral icterus.     Extraocular Movements: Extraocular movements intact.      Conjunctiva/sclera: Conjunctivae normal.   Neck:      Vascular: No carotid bruit.   Cardiovascular:      Rate and Rhythm: Normal rate and regular rhythm.      Pulses: Normal pulses.      Heart sounds: Normal heart sounds.   Pulmonary:      Effort: Pulmonary effort is normal.      Breath sounds: Normal breath sounds.   Abdominal:      General: Bowel sounds are normal. There is no distension.      Palpations: Abdomen is soft.   Musculoskeletal:         General: No swelling. Normal range of motion.   Lymphadenopathy:      Cervical: No cervical adenopathy.   Skin:     General: Skin is warm and dry.   Neurological:      General: No focal deficit present.      Mental Status: He is alert and oriented to person, place, and time.   Psychiatric:         Mood and Affect: Mood normal.         Behavior: Behavior normal.           CBC:  Recent Labs   Lab 06/27/19  0856 06/27/19  0856 10/05/21  1053 10/05/21  1053 10/06/21  1334   WBC 6.77   < > 7.08   < > 7.36   RBC 5.02   < > 5.36   < > 5.32   Hemoglobin 14.9   < > 16.0   < > 15.4   Hematocrit 44.5   < > 46.6   < > 45.1   Platelets 241   < > 259   < > 219   MCV 89   < > 87   < > 85   MCH 29.7   < > 29.9   < > 28.9   MCHC 33.5  --  34.3  --   34.1    < > = values in this interval not displayed.     CMP:  Recent Labs   Lab 10/06/21  1334 10/06/21  1334 11/05/21  0950 11/05/21  0950 02/03/22  1656   Glucose 245 H   < > 118 H   < > 97  97   Calcium 10.4   < > 9.8   < > 10.2  10.2   Albumin 4.6   < > 4.5   < > 4.5  4.5   Total Protein 8.0   < > 7.7   < > 7.6  7.6   Sodium 137   < > 139   < > 141  141   Potassium 4.2   < > 4.3   < > 4.0  4.0   CO2 23   < > 25   < > 30 H  30 H   Chloride 102   < > 105   < > 106  106   BUN 12   < > 12   < > 9  9   Creatinine 1.3   < > 1.1   < > 1.1  1.1   Alkaline Phosphatase 90   < > 60   < > 65  65   ALT 39   < > 38   < > 21  21   AST 19   < > 25   < > 16  16   Total Bilirubin 1.0  --  0.8  --  0.6  0.6    < > = values in this interval not displayed.     URINALYSIS:  Recent Labs   Lab 10/06/21  1411   Color, UA Yellow   Specific Gravity, UA 1.015   pH, UA 5.0   Protein, UA Negative   Bacteria None   Nitrite, UA Negative   Leukocytes, UA Negative   Urobilinogen, UA Negative   Hyaline Casts, UA 2 A      LIPIDS:  Recent Labs   Lab 06/27/19  0856 10/05/21  1053 11/05/21  0950   TSH 1.018 1.415  --    HDL 36 L 37 L 36 L   Cholesterol 179 220 H 188   Triglycerides 266 H 462 H 100   LDL Cholesterol 89.8 Invalid, Trig>400.0 132.0   HDL/Cholesterol Ratio 20.1 16.8 L 19.1 L   Non-HDL Cholesterol 143 183 152   Total Cholesterol/HDL Ratio 5.0 5.9 H 5.2 H     TSH:  Recent Labs   Lab 06/27/19  0856 10/05/21  1053   TSH 1.018 1.415       A1C:  Recent Labs   Lab 06/27/19  0856 10/05/21  1053 11/05/21  0950 02/03/22  1656   Hemoglobin A1C 6.0 H 13.8 H 10.6 H 6.5 H       Imaging:  X-Ray Chest PA And Lateral  Narrative: EXAMINATION:  XR CHEST PA AND LATERAL    CLINICAL HISTORY:  Shortness of breath    TECHNIQUE:  PA and lateral views of the chest were performed.    COMPARISON:  04/12/2012    FINDINGS:  Cardiac size is normal.  Lungs are clear no infiltrate or cardiac failure is noted.  No evidence of pleural  effusion.  Impression: See above    Electronically signed by: Matthew Jackson MD  Date:    10/05/2021  Time:    10:53      Assessment:       1. Type 2 diabetes mellitus with hyperosmolarity without coma, without long-term current use of insulin    2. Essential hypertension    3. Mixed hyperlipidemia    4. Vitamin D deficiency    5. Constipation, unspecified constipation type          Chronic conditions status updated as per HPI. Other than changes above, cont current medications and maintain follow up with specialist. Return to clinic in 3 months.  Plan:       Jl was seen today for diabetes.    Diagnoses and all orders for this visit:    Type 2 diabetes mellitus with hyperosmolarity without coma, without long-term current use of insulin  -     Hemoglobin A1C; Future  -     Comprehensive Metabolic Panel; Future  -     Ambulatory referral/consult to Endocrinology; Future  -     Microalbumin/Creatinine Ratio, Urine  -     Diabetic Eye Screening Photo; Future  - Januvia was considered with his history of elevated - Accu-Cheks at work but his A1c has improved with a good fasting blood glucose.  I was expecting a higher C-peptide with his A1c.  With his history of significant weight loss when diabetes not treated out immune or genetic causes of hyperglycemia could be considered.  Endocrinology will be consulted.  Appears to be acting as type 2 diabetes mellitus but would await evaluation on possible diabetes 1.5 before considering medications like Jardiance.  If does not tolerate metformin we could consider Januvia with close follow-up.    Essential hypertension   - mild elevation of the diastolic blood pressure but previously normal.  Continue to monitor.  Encourage diet and lifestyle modifications with exercise and weight control.    Mixed hyperlipidemia   - on pravastatin.    Vitamin D deficiency  -     cholecalciferol, vitamin D3, 1,250 mcg (50,000 unit) capsule; Volodymyr 1 tableta por semana x 12  semanas    Constipation, unspecified constipation type  -     docusate sodium (COLACE) 100 MG capsule; Take 1 capsule (100 mg total) by mouth 2 (two) times daily as needed for Constipation.          Health Maintenance Due   Topic Date Due    Diabetes Urine Screening  Never done    Pneumococcal Vaccines (Age 0-64) (1 of 2 - PPSV23) Never done    Eye Exam  Never done        Kimberly Mcintosh MD  [facility]  Disclaimer:  This note has been generated using voice-recognition software. There may be grammatical or spelling errors that have been missed during proof-reading

## 2022-02-04 ENCOUNTER — PATIENT MESSAGE (OUTPATIENT)
Dept: FAMILY MEDICINE | Facility: CLINIC | Age: 42
End: 2022-02-04
Payer: COMMERCIAL

## 2022-02-04 ENCOUNTER — TELEPHONE (OUTPATIENT)
Dept: FAMILY MEDICINE | Facility: CLINIC | Age: 42
End: 2022-02-04
Payer: COMMERCIAL

## 2022-02-07 ENCOUNTER — CLINICAL SUPPORT (OUTPATIENT)
Dept: DIABETES | Facility: CLINIC | Age: 42
End: 2022-02-07
Payer: COMMERCIAL

## 2022-02-07 ENCOUNTER — OFFICE VISIT (OUTPATIENT)
Dept: ENDOCRINOLOGY | Facility: CLINIC | Age: 42
End: 2022-02-07
Payer: COMMERCIAL

## 2022-02-07 VITALS
DIASTOLIC BLOOD PRESSURE: 82 MMHG | HEIGHT: 72 IN | WEIGHT: 235.81 LBS | BODY MASS INDEX: 31.94 KG/M2 | SYSTOLIC BLOOD PRESSURE: 116 MMHG

## 2022-02-07 DIAGNOSIS — E11.9 TYPE 2 DIABETES MELLITUS WITHOUT COMPLICATION, WITHOUT LONG-TERM CURRENT USE OF INSULIN: ICD-10-CM

## 2022-02-07 DIAGNOSIS — E11.9 TYPE 2 DIABETES MELLITUS WITHOUT COMPLICATION, WITHOUT LONG-TERM CURRENT USE OF INSULIN: Primary | ICD-10-CM

## 2022-02-07 DIAGNOSIS — E11.00 TYPE 2 DIABETES MELLITUS WITH HYPEROSMOLARITY WITHOUT COMA, WITHOUT LONG-TERM CURRENT USE OF INSULIN: ICD-10-CM

## 2022-02-07 DIAGNOSIS — E78.2 MIXED HYPERLIPIDEMIA: ICD-10-CM

## 2022-02-07 DIAGNOSIS — I10 ESSENTIAL HYPERTENSION: ICD-10-CM

## 2022-02-07 DIAGNOSIS — E55.9 VITAMIN D DEFICIENCY: ICD-10-CM

## 2022-02-07 PROBLEM — B00.9 ROUTINE CULTURES POSITIVE FOR HSV1: Status: RESOLVED | Noted: 2017-04-10 | Resolved: 2022-02-07

## 2022-02-07 PROBLEM — R79.89 ELEVATED LFTS: Status: RESOLVED | Noted: 2017-04-10 | Resolved: 2022-02-07

## 2022-02-07 PROBLEM — R82.81 PYURIA: Status: RESOLVED | Noted: 2017-04-10 | Resolved: 2022-02-07

## 2022-02-07 PROCEDURE — 99999 PR PBB SHADOW E&M-EST. PATIENT-LVL III: CPT | Mod: PBBFAC,,, | Performed by: INTERNAL MEDICINE

## 2022-02-07 PROCEDURE — G0108 DIAB MANAGE TRN  PER INDIV: HCPCS | Mod: S$GLB,,, | Performed by: INTERNAL MEDICINE

## 2022-02-07 PROCEDURE — 99204 OFFICE O/P NEW MOD 45 MIN: CPT | Mod: S$GLB,,, | Performed by: INTERNAL MEDICINE

## 2022-02-07 PROCEDURE — 99999 PR PBB SHADOW E&M-EST. PATIENT-LVL II: ICD-10-PCS | Mod: PBBFAC,,,

## 2022-02-07 PROCEDURE — 99999 PR PBB SHADOW E&M-EST. PATIENT-LVL II: CPT | Mod: PBBFAC,,,

## 2022-02-07 PROCEDURE — 99204 PR OFFICE/OUTPT VISIT, NEW, LEVL IV, 45-59 MIN: ICD-10-PCS | Mod: S$GLB,,, | Performed by: INTERNAL MEDICINE

## 2022-02-07 PROCEDURE — G0108 PR DIAB MANAGE TRN  PER INDIV: ICD-10-PCS | Mod: S$GLB,,, | Performed by: INTERNAL MEDICINE

## 2022-02-07 PROCEDURE — 99999 PR PBB SHADOW E&M-EST. PATIENT-LVL III: ICD-10-PCS | Mod: PBBFAC,,, | Performed by: INTERNAL MEDICINE

## 2022-02-07 RX ORDER — METFORMIN HYDROCHLORIDE 500 MG/1
500 TABLET ORAL
Qty: 90 TABLET | Refills: 3
Start: 2022-02-07 | End: 2022-08-02 | Stop reason: SDUPTHER

## 2022-02-07 RX ORDER — ACETAMINOPHEN 500 MG
1 TABLET ORAL DAILY
Start: 2022-02-07 | End: 2022-04-01 | Stop reason: SDUPTHER

## 2022-02-07 RX ORDER — DULAGLUTIDE 0.75 MG/.5ML
0.75 INJECTION, SOLUTION SUBCUTANEOUS
Qty: 4 PEN | Refills: 11 | Status: SHIPPED | OUTPATIENT
Start: 2022-02-07 | End: 2022-05-04

## 2022-02-07 NOTE — ASSESSMENT & PLAN NOTE
Reviewed goals of therapy are to get the best control we can without hypoglycemia    Refer to education- for Trulicity start and to discuss making healthier choices with regards to diet      Reviewed that we would like to add a medication for diabetes that would help weight loss.  Was given the choices of incretin or GLP 1. He opted for a GLP 1     Medication changes:   Stop:  Decrease metformin to 1 pill once a day.  Start:  Trulicity after Education         -Advised occasional self blood glucose monitoring.  Patient encouraged to document glucose results and bring them to every clinic visit      -Hypoglycemia precautions discussed. Instructed on precautions before driving.      -Close adherence to lifestyle changes recommended.      -Periodic follow ups for eye evaluations, foot care and dental care suggested.    rtc in 3 months

## 2022-02-07 NOTE — ASSESSMENT & PLAN NOTE
Reviewed that we would like to add a medication for diabetes that would help weight loss.  Was given the choices of incretin or GLP 1. He opted for a GLP 1

## 2022-02-08 VITALS — BODY MASS INDEX: 31.96 KG/M2 | WEIGHT: 235.69 LBS

## 2022-02-08 NOTE — PROGRESS NOTES
Diabetes Care Specialist Progress Note  Author: Bettina tSack RN, CDE  Date: 2/7/2022    Program Intake  Reason for Diabetes Program Visit:: Initial Diabetes Assessment  Current diabetes risk level:: high  In the last 12 months, have you:: used emergency room services  Was the ER or hospital admission related to diabetes?: Yes  Permission to speak with others about care:: no    Lab Results   Component Value Date    HGBA1C 6.5 (H) 02/03/2022       Clinical    Patient Health Rating  Compared to other people your age, how would you rate your health?: Good    Problem Review  Reviewed Problem List with Patient: yes  Active comorbidities affecting diabetes self-care.: yes  Comorbidities: Other (comment) (Hyperlipidemia)  Reviewed health maintenance: yes    Clinical Assessment  Current Diabetes Treatment: Oral Medication,Injectable (Metformin 500 mg, starting Trulicity)  Have you ever experienced hypoglycemia (low blood sugar)?: no  Have you ever experienced hyperglycemia (high blood sugar)?: no    Medication Information  How do you obtain your medications?: Patient drives  How many days a week do you miss your medications?: Never  Do you sometimes have difficulty refilling your medications?: No         Nutritional Status  Diet: Regular  Meal Plan 24 Hour Recall: Breakfast,Lunch,Dinner,Snack  Meal Plan 24 Hour Recall - Breakfast: skips  Meal Plan 24 Hour Recall - Lunch: meat, salad, brown rice veggies limited to carrots, potatoe, yucca, brocolli  Meal Plan 24 Hour Recall - Dinner: same as above.  Works on [employer], meals prepped on site, mostly high carb foods.  Meal Plan 24 Hour Recall - Snack: drinks water or SF drinks  Change in appetite?: No  Dentation:: Intact  Recent Changes in Weight: No Recent Weight Change  Current nutritional status an area of need that is impacting patient's ability to self-manage diabetes?: No    Additional Social History    Support  Who takes you to your medical appointments?: spouse  Does the  current support meet the patient's needs?: Yes  Is Support an area impacting ability to self-manage diabetes?: No    Access to Mass Media & Technology  Does the patient have access to any of the following devices or technologies?: Smart phone,Internet Access  Media or technology needs impacting ability to self-manage diabetes?: No    Cognitive/Behavioral Health  Alert and Oriented: Yes  Difficulty Thinking: No  Requires Prompting: No  Requires assistance for routine expression?: No  Cognitive or behavioral barriers impacting ability to self-manage diabetes?: No    Culture/Sikhism  Culture or Worship beliefs that may impact ability to access healthcare: Yes  If yes:: Cultural (related to food choices)    Communication  Language preference: English  Hearing Problems: No  Vision Problems: No  Communication needs impacting ability to self-manage diabetes?: No    Health Literacy  Preferred Learning Method: Face to Face,Reading Materials  How often do you need to have someone help you read instructions, pamphlets, or written material from your doctor or pharmacy?: Never  Health literacy needs impacting ability to self-manage diabetes?: No      Diabetes Self-Management Skills Assessment    Diabetes Disease Process/Treatment Options  Patient/caregiver able to state what happens when someone has diabetes.: somewhat  Patient/caregiver knows what type of diabetes they have.: no  Patient/caregiver able to identify at least three signs and symptoms of diabetes.: no  Patient able to identify at least three risk factors for diabetes.: no  Diabetes Disease Process/Treatment Options: Skills Assessment Completed: Yes  Assessment indicates:: Knowledge deficit  Area of need?: Yes    Nutrition/Healthy Eating  Challenges to healthy eating:: portion control  Method of carbohydrate measurement:: no method  Patient can identify foods that impact blood sugar.: yes  Patient-identified foods:: sweets,starches (bread, pasta, rice,  cereal),starchy vegetables (corn, peas, beans),fruit/fruit juice  Nutrition/Healthy Eating Skills Assessment Completed:: Yes  Assessment indicates:: Instruction Needed  Area of need?: Yes    Physical Activity/Exercise  Patient's daily activity level:: constantly moving  Patient formally exercises outside of work.: no  Reasons for not exercising:: time constraints  Patient can identify forms of physical activity.: yes  Stated forms of physical activity:: any movement performed by muscles that uses energy,manual activity at work  Patient can identify reasons why exercise/physical activity is important in diabetes management.: yes  Identified reasons:: strengthens heart, muscles, and bones,tones muscles  Physical Activity/Exercise Skills Assessment Completed: : Yes  Assessment indicates:: Adequate understanding  Area of need?: No    Medications  Patient is able to describe current diabetes management routine.: yes  Diabetes management routine:: oral medications  Patient is able to identify current diabetes medications, dosages, and appropriate timing of medications.: yes  Patient understands the purpose of the medications taken for diabetes.: yes  Patient reports problems or concerns with current medication regimen.: no  Medication Skills Assessment Completed:: Yes  Assessment indicates:: Instruction Needed (Starting Trulicity)  Area of need?: Yes    Home Blood Glucose Monitoring  Patient states that blood sugar is checked at home daily.: yes  Monitoring Method:: home glucometer  Home glucometer meter type:: insurance preferred meter  When do you check your blood sugar?: Before breakfast  When you check what is your typical blood sugar range? : oral recall in am 113 to 240 range  Blood glucose logs:: no,encouraged to keep logs,encouraged to bring logs to provider visits  Blood glucose logs reviewed today?: no  Home Blood Glucose Monitoring Skills Assessment Completed: : Yes  Assessment indicates:: Knowledge  deficit  Area of need?: Yes    Acute Complications  Patient is able to identify types of acute complications: No  Acute Complications Skills Assessment Completed: : Yes  Assessment indicates:: Knowledge deficit  Area of need?: Yes    Chronic Complications  Patient can identify major chronic complications of diabetes.: no  Chronic Complications Skills Assessment Completed: : Yes  Assessment indicates:: Knowledge deficit  Area of need?: Yes    Psychosocial/Coping  Patient can identify ways of coping with chronic disease.: yes  Patient-stated ways of coping with chronic disease:: support from loved ones  Psychosocial/Coping Skills Assessment Completed: : Yes  Assessment indicates:: Adequate understanding  Area of need?: No        Assessment Summary and Plan    Based on today's diabetes care assessment, the following areas of need were identified:      Social 2/7/2022   Support No   Access to Mass Media/Tech No   Cognitive/Behavioral Health No   Culture/Shinto Yes   Communication No   Health Literacy No        Clinical 2/7/2022   Nutritional Status No        Diabetes Self-Management Skills 2/7/2022   Diabetes Disease Process/Treatment Options Yes, Provided DM management guide and provided instruction regarding signs and symptoms, risk factors of diabetes, increased risk for cardio and cerebral vascular events.  Instructed patient on what is diabetes and the progression of the disease. Discussed significance of current A1c and blood glucose goals   Nutrition/Healthy Eating Yes, see care planning   Physical Activity/Exercise No   Medication Yes,see care planning   Home Blood Glucose Monitoring Yes, encouraged to test at least once per day and to keep logs of bg to monitor progress   Acute Complications Yes - Reviewed blood glucose goals, prevention, detection, signs and symptoms, and treatment of hypoglycemia and hyperglycemia, and when to contact the clinic.   Chronic Complications Yes, Patient agrees to have the  following diabetes elly maintenance screenings/appointments yearly eye exams, foot exams.    Psychosocial/Coping No          Today's interventions were provided through individual discussion, instruction, and written materials were provided.      Patient verbalized understanding of instruction and written materials.  Pt was able to return back demonstration of instructions today. Patient understood key points, needs reinforcement and further instruction.     Diabetes Self-Management Care Plan:    Today's Diabetes Self-Management Care Plan was developed with Jl's input. Jl has agreed to work toward the following goal(s) to improve his/her overall diabetes control.      Care Plan: Diabetes Management   Updates made since  2/7/2022 12:00 AM      Problem: Medications       Goal: Patient Agrees to take Diabetes Medication(s) as prescribed.    Start Date: 2/7/2022   Expected End Date: 5/6/2022   Priority: High   Barriers: No Barriers Identified   Note:    TRAINING FOR TRULICITY START    The patient is here in clinic today to attend an individual appointment for training on once-a-week Trulicity Pen injections. Patient was given background information on Trulicity and how it works. Covered in detail how to use the Trulicity pen (timing - when to take it, meal planning, storage, and pen replacement). Discussed what to expect: side effects, possible hypoglycemia, and blood sugar control. Reviewed causes of hypoglycemia including signs, symptoms, and treatment options.    Instructed to remove gray base and then place the clear base flat and firmly against the skin, unlock the pen by turning the lock ring, press the green button and hold for 5-10 seconds. Patient advised that a click sound will be heard when the green button is pressed and will hear a second click sound when the dose is complete and the gray part of the pen inside of the pen will be visible.   Pt reminded to keep unused pens in refrigerator until ready  for new weekly injection.     Patient performed successful return demonstration.  Will start today and continue with Metformin.     Task: Reviewed with patient all current diabetes medications and provided basic review of the purpose, dosage, frequency, side effects, and storage of both oral and injectable diabetes medications. Completed 2/7/2022      Task: Discussed guidelines for preventing, detecting and treating hypoglycemia and hyperglycemia and reviewed the importance of meal and medication timing with diabetes mediations for prevention of hypoglycemia and maximum drug benefit. Completed 2/7/2022      Problem: Healthy Eating       Goal: Eat 2-3 meals daily with 45-60g/3-4 servings of Carbohydrate per meal. Limit snacking in between meal to 1 serving (15 grams).    Start Date: 2/7/2022   Expected End Date: 5/6/2022   Priority: Medium   Barriers: Lack of Motivation to Change   Note:    Reviewed meal planning and general nutritional counseling with regards to diabetes management. Reviewed patients eating habits.  Patient's diet tends to be high in carbohydrates due to cultural preferences.  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  Most of the foods prepped on site tend to be high in fats and carbs. Encouraged increased consumption of non starchy veggies to diet.  Suggested eating more salad when at work. Stressed portion control especially for high starch foods like rice, beans, tortillas, bread.      Task: Reviewed the sources and role of Carbohydrate, Protein, and Fat and how each nutrient impacts blood sugar. Completed 2/7/2022      Task: Provided visual examples using dry measuring cups, food models, and other familiar objects such as computer mouse, deck or cards, tennis ball etc. to help with visualization of portions. Completed 2/7/2022      Task: Explained how to count carbohydrates using the food label and the use of dry measuring cups for accurate carb counting. Completed 2/7/2022       Task: Discussed strategies for choosing healthier menu options when dining out. Completed 2/7/2022      Task: Recommended replacing beverages containing high sugar content with noncaloric/sugar free options and/or water. Completed 2/7/2022      Task: Review the importance of balancing carbohydrates with each meal using portion control techniques to count servings of carbohydrate and label reading to identify serving size and amount of total carbs per serving. Completed 2/7/2022      Task: Provided Sample plate method and reviewed the use of the plate to estimate amounts of carbohydrate per meal. Completed 2/7/2022          Follow Up Plan     Follow up in about 3 months, due to work schedule, advised patient to call number provided.     Today's care plan and follow up schedule was discussed with patient.  Jl verbalized understanding of the care plan, goals, and agrees to follow up plan.        The patient was encouraged to communicate with his/her health care provider/physician and care team regarding his/her condition(s) and treatment.  I provided the patient with my contact information today and encouraged to contact me via phone or [facility] Portal as needed.     Length of Visit   Total Time: 60 Minutes

## 2022-02-09 DIAGNOSIS — E11.9 TYPE 2 DIABETES MELLITUS WITHOUT COMPLICATION: ICD-10-CM

## 2022-04-04 RX ORDER — ACETAMINOPHEN 500 MG
2000 TABLET ORAL DAILY
Start: 2022-04-04 | End: 2022-08-02 | Stop reason: SDUPTHER

## 2022-04-20 ENCOUNTER — PATIENT OUTREACH (OUTPATIENT)
Dept: ADMINISTRATIVE | Facility: HOSPITAL | Age: 42
End: 2022-04-20
Payer: COMMERCIAL

## 2022-05-31 ENCOUNTER — PATIENT MESSAGE (OUTPATIENT)
Dept: ADMINISTRATIVE | Facility: HOSPITAL | Age: 42
End: 2022-05-31
Payer: COMMERCIAL

## 2022-06-14 ENCOUNTER — OFFICE VISIT (OUTPATIENT)
Dept: URGENT CARE | Facility: CLINIC | Age: 42
End: 2022-06-14
Payer: COMMERCIAL

## 2022-06-14 VITALS
RESPIRATION RATE: 20 BRPM | SYSTOLIC BLOOD PRESSURE: 122 MMHG | TEMPERATURE: 97 F | HEIGHT: 72 IN | BODY MASS INDEX: 31.83 KG/M2 | DIASTOLIC BLOOD PRESSURE: 77 MMHG | HEART RATE: 86 BPM | WEIGHT: 235 LBS | OXYGEN SATURATION: 98 %

## 2022-06-14 DIAGNOSIS — U07.1 COVID-19 VIRUS DETECTED: ICD-10-CM

## 2022-06-14 DIAGNOSIS — R05.9 COUGH: ICD-10-CM

## 2022-06-14 DIAGNOSIS — U07.1 COVID-19 VIRUS INFECTION: Primary | ICD-10-CM

## 2022-06-14 LAB
CTP QC/QA: YES
SARS-COV-2 RDRP RESP QL NAA+PROBE: POSITIVE

## 2022-06-14 PROCEDURE — 99213 OFFICE O/P EST LOW 20 MIN: CPT | Mod: S$GLB,,, | Performed by: PHYSICIAN ASSISTANT

## 2022-06-14 PROCEDURE — 99213 PR OFFICE/OUTPT VISIT, EST, LEVL III, 20-29 MIN: ICD-10-PCS | Mod: S$GLB,,, | Performed by: PHYSICIAN ASSISTANT

## 2022-06-14 PROCEDURE — U0002: ICD-10-PCS | Mod: QW,S$GLB,, | Performed by: PHYSICIAN ASSISTANT

## 2022-06-14 PROCEDURE — U0002 COVID-19 LAB TEST NON-CDC: HCPCS | Mod: QW,S$GLB,, | Performed by: PHYSICIAN ASSISTANT

## 2022-06-14 NOTE — LETTER
"Lj "Sandra Haynes was seen and treated in our urgent care department on 6/14/2022.     COVID-19 is present in our communities across the state. There is limited testing for COVID at this time, so not all patients can be tested. In this situation, your employee meets the following criteria:    Jl Haynes has met the criteria for COVID-19 testing and has a POSITIVE result. He can return to work once they are asymptomatic for 24 hours without the use of fever reducing medications AND at least five days from the first symptoms.  A mask is recommended for 5 days post quarantine.      If you have any questions or concerns, or if I can be of further assistance, please do not hesitate to contact me.    Sincerely,         "

## 2022-06-14 NOTE — PATIENT INSTRUCTIONS
PLEASE READ YOUR DISCHARGE INSTRUCTIONS ENTIRELY AS IT CONTAINS IMPORTANT INFORMATION.  - Rest.    - Drink plenty of fluids.    - Tylenol or Ibuprofen as directed as needed for fever/pain.    - If you were prescribed antibiotics, please take them to completion.  - If you are female and on birth control pills - please use additional methods of contraception to prevent pregnancy while on antibiotics and for one cycle after.   - If you were prescribed a narcotic medication, a cough syrup, or a muscle relaxer, do not drive or operate heavy equipment or machinery while taking these medications, as they can cause drowsiness.   - If a referral to a specialty was made today, you should receive a phone call in the next few days to schedule an appt.  Please call [phone] to schedule an appt if have not gotten a phone call in the next few days.  - If you smoke, please stop smoking.  -You must understand that you've received an Urgent Care treatment only and that you may be released before all your medical problems are known or treated. You, the patient, will arrange for follow up care as instructed. Please arrange follow up with your primary medical clinic as soon as possible.   - Follow up with your PCP or specialty clinic as directed in the next 1-2 weeks if not improved or as needed.  You can call [phone] to schedule an appointment with the appropriate provider.    - Please return to Urgent Care or to the Emergency Department if your symptoms worsen.    Patient aware and verbalized understanding.

## 2022-06-14 NOTE — PROGRESS NOTES
Subjective:       Patient ID: Jl Haynes is a 41 y.o. male.    Chief Complaint: No chief complaint on file.    HPI  ROS     Objective:      Physical Exam    Assessment:       No diagnosis found.    Plan:                   No follow-ups on file.

## 2022-06-14 NOTE — PROGRESS NOTES
Subjective:       Patient ID: Jl Haynes is a 41 y.o. male.    Vitals:  height is 6' (1.829 m) and weight is 106.6 kg (235 lb). His temperature is 97.1 °F (36.2 °C). His blood pressure is 122/77 and his pulse is 86. His respiration is 20 and oxygen saturation is 98%.     Chief Complaint: URI    Mr. Haynes presents for evaluation of sinus pain, sneezing, congestion, cough, fever, chills that started Saturday.  He thinks he may have been exposed to COVID-19 at work.  He is vaccinated and boosted against COVID-19.  He denies any shortness of breath, chest pain, leg swelling, nausea, vomiting, diarrhea, anosmia or ageusia.  He has taken tylenol for symptoms with some relief.         URI   This is a new problem. The current episode started yesterday. The problem has been gradually worsening. There has been no fever. Associated symptoms include congestion, coughing, sinus pain and sneezing. Pertinent negatives include no abdominal pain, chest pain, diarrhea, dysuria, ear pain, headaches, nausea, neck pain, rash, sore throat or vomiting. He has tried acetaminophen for the symptoms.       Constitution: Positive for chills and fever. Negative for appetite change, sweating and fatigue.   HENT: Positive for congestion and sinus pain. Negative for ear pain, ear discharge, postnasal drip, sinus pressure and sore throat.    Neck: Negative for neck pain and neck stiffness.   Cardiovascular: Negative for chest trauma, chest pain, leg swelling, palpitations, sob on exertion and passing out.   Eyes: Negative for blurred vision.   Respiratory: Positive for cough. Negative for shortness of breath.    Gastrointestinal: Negative for abdominal pain, nausea, vomiting and diarrhea.   Genitourinary: Negative for dysuria, frequency and urgency.   Musculoskeletal: Negative for pain.   Skin: Negative for rash.   Allergic/Immunologic: Positive for sneezing.   Neurological: Negative for dizziness, history of vertigo, light-headedness, passing  out, facial drooping, speech difficulty, coordination disturbances, loss of balance and headaches.   Hematologic/Lymphatic: Negative for easy bruising/bleeding. Does not bruise/bleed easily.   Psychiatric/Behavioral: Negative for confusion.       Objective:      Physical Exam   Constitutional: He is oriented to person, place, and time. He appears well-developed. He is cooperative.  Non-toxic appearance. He does not appear ill. No distress.   HENT:   Head: Normocephalic and atraumatic.   Ears:   Right Ear: Hearing, tympanic membrane, external ear and ear canal normal.   Left Ear: Hearing, tympanic membrane, external ear and ear canal normal.   Nose: Rhinorrhea present. No mucosal edema or nasal deformity. No epistaxis. Right sinus exhibits no maxillary sinus tenderness and no frontal sinus tenderness. Left sinus exhibits no maxillary sinus tenderness and no frontal sinus tenderness.   Mouth/Throat: Uvula is midline, oropharynx is clear and moist and mucous membranes are normal. No trismus in the jaw. Normal dentition. No uvula swelling. No oropharyngeal exudate, posterior oropharyngeal edema or posterior oropharyngeal erythema.   Eyes: Conjunctivae and lids are normal. No scleral icterus.   Neck: Trachea normal and phonation normal. Neck supple. No edema present. No erythema present. No neck rigidity present.   Cardiovascular: Normal rate, regular rhythm, normal heart sounds and normal pulses.   Pulmonary/Chest: Effort normal and breath sounds normal. No stridor. No respiratory distress. He has no decreased breath sounds. He has no wheezes. He has no rhonchi. He has no rales.   Abdominal: Normal appearance.   Musculoskeletal: Normal range of motion.         General: No deformity. Normal range of motion.   Lymphadenopathy:     He has no cervical adenopathy.   Neurological: He is alert and oriented to person, place, and time. He exhibits normal muscle tone. Coordination normal.   Skin: Skin is warm, dry, intact, not  diaphoretic and not pale.   Psychiatric: His speech is normal and behavior is normal. Judgment and thought content normal.   Nursing note and vitals reviewed.          Results for orders placed or performed in visit on 06/14/22   POCT COVID-19 Rapid Screening   Result Value Ref Range    POC Rapid COVID Positive (A) Negative     Acceptable Yes        Assessment:       1. COVID-19 virus infection    2. Cough          Plan:         COVID-19 virus infection    Cough  -     POCT COVID-19 Rapid Screening    Other orders  -     nirmatrelvir-ritonavir 150 mg x 2- 100 mg copackaged tablets (EUA); Take 3 tablets by mouth 2 (two) times daily for 5 days. Each dose contains 2 nirmatrelvir (pink tablets) and 1 ritonavir (white tablet). Take all 3 tablets together  Dispense: 30 tablet; Refill: 0    COVID Risk Score: 4    Antibody infusion is not available at this time per [facility] unless Paxlovid is directly contraindicated.  Entire medication list reviewed with patient.  Most recent labs reviewed, reveal normal renal function.    Diagnoses and plan discussed with the patient, as well as the expected course and duration of his symptoms.  All questions and concerns were addressed prior to discharge.  He was advised to follow up with his PCP within 1 week if symptoms do not improve.  Emergency department precautions were given.  Patient verbalized understanding and was happy with the plan of care.   Note dictated with voice recognition software, please excuse any grammatical errors.    Patient Instructions   PLEASE READ YOUR DISCHARGE INSTRUCTIONS ENTIRELY AS IT CONTAINS IMPORTANT INFORMATION.  - Rest.    - Drink plenty of fluids.    - Tylenol or Ibuprofen as directed as needed for fever/pain.    - If you were prescribed antibiotics, please take them to completion.  - If you are female and on birth control pills - please use additional methods of contraception to prevent pregnancy while on antibiotics  and for one cycle after.   - If you were prescribed a narcotic medication, a cough syrup, or a muscle relaxer, do not drive or operate heavy equipment or machinery while taking these medications, as they can cause drowsiness.   - If a referral to a specialty was made today, you should receive a phone call in the next few days to schedule an appt.  Please call [phone] to schedule an appt if have not gotten a phone call in the next few days.  - If you smoke, please stop smoking.  -You must understand that you've received an Urgent Care treatment only and that you may be released before all your medical problems are known or treated. You, the patient, will arrange for follow up care as instructed. Please arrange follow up with your primary medical clinic as soon as possible.   - Follow up with your PCP or specialty clinic as directed in the next 1-2 weeks if not improved or as needed.  You can call [phone] to schedule an appointment with the appropriate provider.    - Please return to Urgent Care or to the Emergency Department if your symptoms worsen.    Patient aware and verbalized understanding.

## 2022-06-28 LAB
LEFT EYE DM RETINOPATHY: NEGATIVE
RIGHT EYE DM RETINOPATHY: NEGATIVE

## 2022-07-20 ENCOUNTER — PATIENT OUTREACH (OUTPATIENT)
Dept: ADMINISTRATIVE | Facility: HOSPITAL | Age: 42
End: 2022-07-20
Payer: COMMERCIAL

## 2022-07-20 ENCOUNTER — PATIENT MESSAGE (OUTPATIENT)
Dept: ADMINISTRATIVE | Facility: HOSPITAL | Age: 42
End: 2022-07-20
Payer: COMMERCIAL

## 2022-07-20 NOTE — PROGRESS NOTES
Care Everywhere updates requested and reviewed.  Immunizations reconciled. Media reports reviewed.  Duplicate HM overrides and  orders removed.  Overdue HM topic chart audit and/or requested.  Overdue lab testing linked to upcoming lab appointments if applies.          Health Maintenance Due   Topic Date Due    Diabetes Urine Screening  Never done    Pneumococcal Vaccines (Age 0-64) (1 - PCV) Never done    Eye Exam  Never done

## 2022-08-02 ENCOUNTER — LAB VISIT (OUTPATIENT)
Dept: LAB | Facility: HOSPITAL | Age: 42
End: 2022-08-02
Attending: INTERNAL MEDICINE
Payer: COMMERCIAL

## 2022-08-02 ENCOUNTER — OFFICE VISIT (OUTPATIENT)
Dept: FAMILY MEDICINE | Facility: CLINIC | Age: 42
End: 2022-08-02
Payer: COMMERCIAL

## 2022-08-02 VITALS
OXYGEN SATURATION: 98 % | WEIGHT: 248.25 LBS | BODY MASS INDEX: 33.62 KG/M2 | HEIGHT: 72 IN | DIASTOLIC BLOOD PRESSURE: 86 MMHG | SYSTOLIC BLOOD PRESSURE: 130 MMHG | HEART RATE: 72 BPM

## 2022-08-02 DIAGNOSIS — I10 ESSENTIAL HYPERTENSION: ICD-10-CM

## 2022-08-02 DIAGNOSIS — E11.00 TYPE 2 DIABETES MELLITUS WITH HYPEROSMOLARITY WITHOUT COMA, WITHOUT LONG-TERM CURRENT USE OF INSULIN: Primary | ICD-10-CM

## 2022-08-02 DIAGNOSIS — R39.11 HESITANCY OF MICTURITION: ICD-10-CM

## 2022-08-02 DIAGNOSIS — E55.9 VITAMIN D DEFICIENCY: ICD-10-CM

## 2022-08-02 DIAGNOSIS — R07.9 CHEST PAIN, UNSPECIFIED TYPE: ICD-10-CM

## 2022-08-02 DIAGNOSIS — E78.2 MIXED HYPERLIPIDEMIA: ICD-10-CM

## 2022-08-02 DIAGNOSIS — E11.00 TYPE 2 DIABETES MELLITUS WITH HYPEROSMOLARITY WITHOUT COMA, WITHOUT LONG-TERM CURRENT USE OF INSULIN: ICD-10-CM

## 2022-08-02 LAB
ALBUMIN SERPL BCP-MCNC: 4.6 G/DL (ref 3.5–5.2)
ALP SERPL-CCNC: 60 U/L (ref 55–135)
ALT SERPL W/O P-5'-P-CCNC: 37 U/L (ref 10–44)
ANION GAP SERPL CALC-SCNC: 9 MMOL/L (ref 8–16)
AST SERPL-CCNC: 22 U/L (ref 10–40)
BASOPHILS # BLD AUTO: 0.04 K/UL (ref 0–0.2)
BASOPHILS NFR BLD: 0.6 % (ref 0–1.9)
BILIRUB SERPL-MCNC: 0.7 MG/DL (ref 0.1–1)
BUN SERPL-MCNC: 12 MG/DL (ref 6–20)
CALCIUM SERPL-MCNC: 10.1 MG/DL (ref 8.7–10.5)
CHLORIDE SERPL-SCNC: 108 MMOL/L (ref 95–110)
CHOLEST SERPL-MCNC: 162 MG/DL (ref 120–199)
CHOLEST/HDLC SERPL: 4.3 {RATIO} (ref 2–5)
CO2 SERPL-SCNC: 26 MMOL/L (ref 23–29)
CREAT SERPL-MCNC: 1.1 MG/DL (ref 0.5–1.4)
DIFFERENTIAL METHOD: ABNORMAL
EOSINOPHIL # BLD AUTO: 0.1 K/UL (ref 0–0.5)
EOSINOPHIL NFR BLD: 1.5 % (ref 0–8)
ERYTHROCYTE [DISTWIDTH] IN BLOOD BY AUTOMATED COUNT: 13.5 % (ref 11.5–14.5)
EST. GFR  (NO RACE VARIABLE): >60 ML/MIN/1.73 M^2
ESTIMATED AVG GLUCOSE: 166 MG/DL (ref 68–131)
GLUCOSE SERPL-MCNC: 113 MG/DL (ref 70–110)
HBA1C MFR BLD: 7.4 % (ref 4–5.6)
HCT VFR BLD AUTO: 42.2 % (ref 40–54)
HDLC SERPL-MCNC: 38 MG/DL (ref 40–75)
HDLC SERPL: 23.5 % (ref 20–50)
HGB BLD-MCNC: 14.5 G/DL (ref 14–18)
IMM GRANULOCYTES # BLD AUTO: 0.02 K/UL (ref 0–0.04)
IMM GRANULOCYTES NFR BLD AUTO: 0.3 % (ref 0–0.5)
LDLC SERPL CALC-MCNC: 96.2 MG/DL (ref 63–159)
LYMPHOCYTES # BLD AUTO: 2.6 K/UL (ref 1–4.8)
LYMPHOCYTES NFR BLD: 38.7 % (ref 18–48)
MCH RBC QN AUTO: 29.1 PG (ref 27–31)
MCHC RBC AUTO-ENTMCNC: 34.4 G/DL (ref 32–36)
MCV RBC AUTO: 85 FL (ref 82–98)
MONOCYTES # BLD AUTO: 0.6 K/UL (ref 0.3–1)
MONOCYTES NFR BLD: 9.5 % (ref 4–15)
NEUTROPHILS # BLD AUTO: 3.3 K/UL (ref 1.8–7.7)
NEUTROPHILS NFR BLD: 49.4 % (ref 38–73)
NONHDLC SERPL-MCNC: 124 MG/DL
NRBC BLD-RTO: 0 /100 WBC
PLATELET # BLD AUTO: 247 K/UL (ref 150–450)
PMV BLD AUTO: 9 FL (ref 9.2–12.9)
POTASSIUM SERPL-SCNC: 4.1 MMOL/L (ref 3.5–5.1)
PROT SERPL-MCNC: 8 G/DL (ref 6–8.4)
RBC # BLD AUTO: 4.98 M/UL (ref 4.6–6.2)
SODIUM SERPL-SCNC: 143 MMOL/L (ref 136–145)
TRIGL SERPL-MCNC: 139 MG/DL (ref 30–150)
TSH SERPL DL<=0.005 MIU/L-ACNC: 1.04 UIU/ML (ref 0.4–4)
WBC # BLD AUTO: 6.71 K/UL (ref 3.9–12.7)

## 2022-08-02 PROCEDURE — 85025 COMPLETE CBC W/AUTO DIFF WBC: CPT | Performed by: INTERNAL MEDICINE

## 2022-08-02 PROCEDURE — 99999 PR PBB SHADOW E&M-EST. PATIENT-LVL IV: ICD-10-PCS | Mod: PBBFAC,,, | Performed by: INTERNAL MEDICINE

## 2022-08-02 PROCEDURE — 84443 ASSAY THYROID STIM HORMONE: CPT | Performed by: INTERNAL MEDICINE

## 2022-08-02 PROCEDURE — 99215 PR OFFICE/OUTPT VISIT, EST, LEVL V, 40-54 MIN: ICD-10-PCS | Mod: S$GLB,,, | Performed by: INTERNAL MEDICINE

## 2022-08-02 PROCEDURE — 80053 COMPREHEN METABOLIC PANEL: CPT | Performed by: INTERNAL MEDICINE

## 2022-08-02 PROCEDURE — 36415 COLL VENOUS BLD VENIPUNCTURE: CPT | Performed by: INTERNAL MEDICINE

## 2022-08-02 PROCEDURE — 83036 HEMOGLOBIN GLYCOSYLATED A1C: CPT | Performed by: INTERNAL MEDICINE

## 2022-08-02 PROCEDURE — 99999 PR PBB SHADOW E&M-EST. PATIENT-LVL IV: CPT | Mod: PBBFAC,,, | Performed by: INTERNAL MEDICINE

## 2022-08-02 PROCEDURE — 99215 OFFICE O/P EST HI 40 MIN: CPT | Mod: S$GLB,,, | Performed by: INTERNAL MEDICINE

## 2022-08-02 PROCEDURE — 80061 LIPID PANEL: CPT | Performed by: INTERNAL MEDICINE

## 2022-08-02 RX ORDER — METFORMIN HYDROCHLORIDE 500 MG/1
500 TABLET ORAL 2 TIMES DAILY WITH MEALS
Qty: 90 TABLET | Refills: 3
Start: 2022-08-02 | End: 2022-10-07 | Stop reason: SDUPTHER

## 2022-08-02 RX ORDER — PRAVASTATIN SODIUM 20 MG/1
20 TABLET ORAL DAILY
Qty: 90 TABLET | Refills: 3 | Status: SHIPPED | OUTPATIENT
Start: 2022-08-02 | End: 2023-07-03

## 2022-08-02 RX ORDER — ACETAMINOPHEN 500 MG
2000 TABLET ORAL DAILY
Start: 2022-08-02 | End: 2022-08-05

## 2022-08-02 RX ORDER — ORAL SEMAGLUTIDE 3 MG/1
3 TABLET ORAL DAILY
Qty: 30 TABLET | Refills: 2 | Status: SHIPPED | OUTPATIENT
Start: 2022-08-02 | End: 2022-10-07 | Stop reason: SDUPTHER

## 2022-08-02 RX ORDER — LOSARTAN POTASSIUM 100 MG/1
100 TABLET ORAL DAILY
Qty: 90 TABLET | Refills: 3 | Status: SHIPPED | OUTPATIENT
Start: 2022-08-02 | End: 2022-10-10

## 2022-08-02 NOTE — PROGRESS NOTES
Subjective:       Patient ID: Jl Haynes is a 41 y.o. male.    Chief Complaint: Follow-up      HPI  Jl Haynes is a 41 y.o. male with chronic conditions of diabetes mellitus type 2, hypertension, hyperlipidemia, and enlarged prostate who presents today for follow up.    Reports has been anxious and not following diet or checking his blood sugars as he should. 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 Only taking Metformin 500mg daily. Has been stress eating and gained 30 lbs. He is urinating more. Has not seen endocrinology.    Blood pressure has been controlled. Has noted left side chest pain radiating to the arm. Not reproducible and sometimes exertional.  Stopped Amitriptyline as he passed out X3 with the medication.    Has been having a testicular pain with history of hydrocele. Also experiencing ED.     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    Does not smoke or drink.    Had COVID 8 wks ago with no complications.      Health Maintenance:  Health Maintenance   Topic Date Due    Eye Exam  Never done    Hemoglobin A1c  08/03/2022    Lipid Panel  11/05/2022    Low Dose Statin  02/07/2023    Foot Exam  02/07/2023    TETANUS VACCINE  11/05/2031    Hepatitis C Screening  Completed       Review of Systems   Constitutional: Negative for activity change, fever and unexpected weight change.   HENT: Negative for hearing loss, rhinorrhea and trouble swallowing.    Eyes: Negative for discharge and visual disturbance.   Respiratory: Negative for chest tightness and wheezing.    Cardiovascular: Positive for chest pain. Negative for palpitations.   Gastrointestinal: Negative for blood in stool, constipation, diarrhea and vomiting.   Endocrine: Negative for polydipsia and polyuria.   Genitourinary: Positive for erectile dysfunction and testicular pain. Negative for difficulty urinating,  hematuria and urgency.   Musculoskeletal: Negative for arthralgias, joint swelling and neck pain.   Neurological: Negative for weakness and headaches.   Psychiatric/Behavioral: Negative for confusion and dysphoric mood.      Past Medical History:   Diagnosis Date    Depression     Enlarged prostate     Essential hypertension 2021    HTN (hypertension)     Hyperlipidemia     Type 2 diabetes mellitus with hyperosmolarity without coma, without long-term current use of insulin 2021       Past Surgical History:   Procedure Laterality Date    UMBILICAL HERNIA REPAIR         Family History   Problem Relation Age of Onset    Diabetes Mother     Hypertension Mother     Hypertension Father     Diabetes Mellitus Maternal Grandmother     Dementia Maternal Grandmother     Heart attack Maternal Grandfather     Diabetes Mellitus Maternal Uncle     Prostate cancer Neg Hx     Kidney disease Neg Hx        Social History     Socioeconomic History    Marital status:    Tobacco Use    Smoking status: Former Smoker     Quit date:      Years since quittin.5    Smokeless tobacco: Never Used   Substance and Sexual Activity    Alcohol use: Yes     Alcohol/week: 1.0 standard drink     Types: 1 Standard drinks or equivalent per week     Comment: Rare social occasions.    Drug use: Not Currently     Types: Marijuana    Sexual activity: Not Currently       Current Outpatient Medications   Medication Sig Dispense Refill    cholecalciferol, vitamin D3, (VITAMIN D3) 50 mcg (2,000 unit) Cap capsule Take 1 capsule (2,000 Units total) by mouth once daily.      clotrimazole (LOTRIMIN) 1 % cream Apply topically 2 (two) times daily. 60 g 0    docusate sodium (COLACE) 100 MG capsule Take 1 capsule (100 mg total) by mouth 2 (two) times daily as needed for Constipation. 30 capsule 2    losartan (COZAAR) 100 MG tablet Take 1 tablet (100 mg total) by mouth once daily. 90 tablet 3    pravastatin (PRAVACHOL)  20 MG tablet Take 1 tablet (20 mg total) by mouth once daily. 90 tablet 3    metFORMIN (GLUCOPHAGE) 500 MG tablet Take 1 tablet (500 mg total) by mouth daily with breakfast. 90 tablet 3     No current facility-administered medications for this visit.       Review of patient's allergies indicates:   Allergen Reactions    Penicillins Anaphylaxis         Objective:       Last 3 sets of Vitals    Vitals - 1 value per visit 6/14/2022 8/2/2022 8/2/2022   SYSTOLIC 122 - 130   DIASTOLIC 77 - 86   Pulse 86 - 72   Temp 97.1 - -   Resp 20 - -   SPO2 98 - 98   Weight (lb) 235 - 248.24   Weight (kg) 106.595 - 112.6   Height 72 - 72   BMI (Calculated) 31.9 - 33.7   VISIT REPORT - - -   Pain Score  - 0 -   Physical Exam  Constitutional:       General: He is not in acute distress.     Appearance: Normal appearance.   HENT:      Head: Normocephalic.      Right Ear: Tympanic membrane, ear canal and external ear normal.      Left Ear: Tympanic membrane, ear canal and external ear normal.      Nose: Nose normal.      Mouth/Throat:      Mouth: Mucous membranes are moist.   Eyes:      General: No scleral icterus.     Extraocular Movements: Extraocular movements intact.      Conjunctiva/sclera: Conjunctivae normal.   Neck:      Vascular: No carotid bruit.      Comments: No goiter.  Cardiovascular:      Rate and Rhythm: Normal rate and regular rhythm.      Pulses: Normal pulses.      Heart sounds: Normal heart sounds.   Pulmonary:      Effort: Pulmonary effort is normal.      Breath sounds: Normal breath sounds.   Abdominal:      General: Bowel sounds are normal. There is no distension.      Palpations: Abdomen is soft. There is no mass.      Tenderness: There is no abdominal tenderness.   Musculoskeletal:         General: No swelling. Normal range of motion.   Lymphadenopathy:      Cervical: No cervical adenopathy.   Skin:     General: Skin is warm and dry.   Neurological:      General: No focal deficit present.      Mental Status: He  is alert and oriented to person, place, and time.   Psychiatric:         Mood and Affect: Mood normal.         Behavior: Behavior normal.           CBC:  Recent Labs   Lab 10/05/21  1053 10/06/21  1334   WBC 7.08 7.36   RBC 5.36 5.32   Hemoglobin 16.0 15.4   Hematocrit 46.6 45.1   Platelets 259 219   MCV 87 85   MCH 29.9 28.9   MCHC 34.3 34.1     CMP:  Recent Labs   Lab 10/06/21  1334 11/05/21  0950 02/03/22  1656   Glucose 245 H 118 H 97  97   Calcium 10.4 9.8 10.2  10.2   Albumin 4.6 4.5 4.5  4.5   Total Protein 8.0 7.7 7.6  7.6   Sodium 137 139 141  141   Potassium 4.2 4.3 4.0  4.0   CO2 23 25 30 H  30 H   Chloride 102 105 106  106   BUN 12 12 9  9   Creatinine 1.3 1.1 1.1  1.1   Alkaline Phosphatase 90 60 65  65   ALT 39 38 21  21   AST 19 25 16  16   Total Bilirubin 1.0 0.8 0.6  0.6     URINALYSIS:  Recent Labs   Lab 10/06/21  1411   Color, UA Yellow   Specific Gravity, UA 1.015   pH, UA 5.0   Protein, UA Negative   Bacteria None   Nitrite, UA Negative   Leukocytes, UA Negative   Urobilinogen, UA Negative   Hyaline Casts, UA 2 A      LIPIDS:  Recent Labs   Lab 10/05/21  1053 11/05/21  0950   TSH 1.415  --    HDL 37 L 36 L   Cholesterol 220 H 188   Triglycerides 462 H 100   LDL Cholesterol Invalid, Trig>400.0 132.0   HDL/Cholesterol Ratio 16.8 L 19.1 L   Non-HDL Cholesterol 183 152   Total Cholesterol/HDL Ratio 5.9 H 5.2 H     TSH:  Recent Labs   Lab 10/05/21  1053   TSH 1.415       A1C:  Recent Labs   Lab 10/05/21  1053 11/05/21  0950 02/03/22  1656   Hemoglobin A1C 13.8 H 10.6 H 6.5 H       Imaging:  X-Ray Chest PA And Lateral  Narrative: EXAMINATION:  XR CHEST PA AND LATERAL    CLINICAL HISTORY:  Shortness of breath    TECHNIQUE:  PA and lateral views of the chest were performed.    COMPARISON:  04/12/2012    FINDINGS:  Cardiac size is normal.  Lungs are clear no infiltrate or cardiac failure is noted.  No evidence of pleural effusion.  Impression: See above    Electronically signed by: Matthew  MD Manuel  Date:    10/05/2021  Time:    10:53      Assessment:       1. Type 2 diabetes mellitus with hyperosmolarity without coma, without long-term current use of insulin    2. Essential hypertension    3. Chest pain, unspecified type    4. Mixed hyperlipidemia    5. Hesitancy of micturition    6. Vitamin D deficiency            Plan:       Jl was seen today for follow-up.    Diagnoses and all orders for this visit:    Type 2 diabetes mellitus with hyperosmolarity without coma, without long-term current use of insulin  -     CBC Auto Differential; Future  -     Comprehensive Metabolic Panel; Future  -     Hemoglobin A1C; Future  -     Lipid Panel; Future  -     TSH; Future  -     Microalbumin/Creatinine Ratio, Urine  -     Urinalysis; Future  -     semaglutide (RYBELSUS) 3 mg tablet; Take 1 tablet (3 mg total) by mouth once daily.  -     metFORMIN (GLUCOPHAGE) 500 MG tablet; Take 1 tablet (500 mg total) by mouth 2 (two) times daily with meals.    Essential hypertension  -     Comprehensive Metabolic Panel; Future  -     losartan (COZAAR) 100 MG tablet; Take 1 tablet (100 mg total) by mouth once daily.    Chest pain, unspecified type  -     CBC Auto Differential; Future  -     Cancel: Stress Echo Which stress agent will be used? Pharmacological; Color Flow Doppler? No; Future  -     Stress Echo Which stress agent will be used? Treadmill Exercise; Color Flow Doppler? No; Future    Mixed hyperlipidemia  -     Lipid Panel; Future  -     pravastatin (PRAVACHOL) 20 MG tablet; Take 1 tablet (20 mg total) by mouth once daily.    Hesitancy of micturition  -     Urinalysis; Future  -     Ambulatory referral/consult to Urology; Future    Vitamin D deficiency    Other orders  -     cholecalciferol, vitamin D3, (VITAMIN D3) 50 mcg (2,000 unit) Cap capsule; Take 1 capsule (2,000 Units total) by mouth once daily.      Health Maintenance Due   Topic Date Due    Diabetes Urine Screening  Never done    Pneumococcal  Vaccines (Age 0-64) (1 - PCV) Never done    Eye Exam  Never done    Hemoglobin A1c  08/03/2022        Return to clinic in 1 months.    Kimberly Mcintosh MD  [facility]  Disclaimer:  This note has been generated using voice-recognition software. There may be grammatical or spelling errors that have been missed during proof-reading

## 2022-08-03 ENCOUNTER — HOSPITAL ENCOUNTER (OUTPATIENT)
Dept: CARDIOLOGY | Facility: HOSPITAL | Age: 42
Discharge: HOME | End: 2022-08-03
Attending: INTERNAL MEDICINE
Payer: COMMERCIAL

## 2022-08-03 DIAGNOSIS — R07.9 CHEST PAIN, UNSPECIFIED TYPE: ICD-10-CM

## 2022-08-03 LAB
CV STRESS BASE HR: 74 BPM
DIASTOLIC BLOOD PRESSURE: 66 MMHG
EJECTION FRACTION: 60 %
OHS CV CPX 1 MINUTE RECOVERY HEART RATE: 133 BPM
OHS CV CPX 85 PERCENT MAX PREDICTED HEART RATE MALE: 152
OHS CV CPX ESTIMATED METS: 11
OHS CV CPX MAX PREDICTED HEART RATE: 179
OHS CV CPX PATIENT IS FEMALE: 0
OHS CV CPX PATIENT IS MALE: 1
OHS CV CPX PEAK DIASTOLIC BLOOD PRESSURE: 72 MMHG
OHS CV CPX PEAK HEAR RATE: 164 BPM
OHS CV CPX PEAK RATE PRESSURE PRODUCT: NORMAL
OHS CV CPX PEAK SYSTOLIC BLOOD PRESSURE: 180 MMHG
OHS CV CPX PERCENT MAX PREDICTED HEART RATE ACHIEVED: 92
OHS CV CPX RATE PRESSURE PRODUCT PRESENTING: 8436
STRESS ANGINA INDEX: 0
STRESS ECHO POST EXERCISE DUR MIN: 9 MINUTES
STRESS ECHO POST EXERCISE DUR SEC: 44 SECONDS
SYSTOLIC BLOOD PRESSURE: 114 MMHG

## 2022-08-03 PROCEDURE — 93351 STRESS ECHO (CUPID ONLY): ICD-10-PCS | Mod: 26,,, | Performed by: INTERNAL MEDICINE

## 2022-08-03 PROCEDURE — 93351 STRESS TTE COMPLETE: CPT | Mod: 26,,, | Performed by: INTERNAL MEDICINE

## 2022-08-03 PROCEDURE — 93351 STRESS TTE COMPLETE: CPT

## 2022-08-04 ENCOUNTER — PATIENT MESSAGE (OUTPATIENT)
Dept: FAMILY MEDICINE | Facility: CLINIC | Age: 42
End: 2022-08-04
Payer: COMMERCIAL

## 2022-08-05 ENCOUNTER — OFFICE VISIT (OUTPATIENT)
Dept: UROLOGY | Facility: CLINIC | Age: 42
End: 2022-08-05
Payer: COMMERCIAL

## 2022-08-05 VITALS — DIASTOLIC BLOOD PRESSURE: 89 MMHG | SYSTOLIC BLOOD PRESSURE: 135 MMHG | HEART RATE: 60 BPM

## 2022-08-05 DIAGNOSIS — N50.812 PAIN IN LEFT TESTICLE: ICD-10-CM

## 2022-08-05 DIAGNOSIS — R39.15 URINARY URGENCY: ICD-10-CM

## 2022-08-05 DIAGNOSIS — N52.9 ERECTILE DYSFUNCTION, UNSPECIFIED ERECTILE DYSFUNCTION TYPE: Primary | ICD-10-CM

## 2022-08-05 PROCEDURE — 99999 PR PBB SHADOW E&M-EST. PATIENT-LVL IV: CPT | Mod: PBBFAC,,, | Performed by: STUDENT IN AN ORGANIZED HEALTH CARE EDUCATION/TRAINING PROGRAM

## 2022-08-05 PROCEDURE — 99205 PR OFFICE/OUTPT VISIT, NEW, LEVL V, 60-74 MIN: ICD-10-PCS | Mod: S$GLB,,, | Performed by: STUDENT IN AN ORGANIZED HEALTH CARE EDUCATION/TRAINING PROGRAM

## 2022-08-05 PROCEDURE — 99205 OFFICE O/P NEW HI 60 MIN: CPT | Mod: S$GLB,,, | Performed by: STUDENT IN AN ORGANIZED HEALTH CARE EDUCATION/TRAINING PROGRAM

## 2022-08-05 PROCEDURE — 99999 PR PBB SHADOW E&M-EST. PATIENT-LVL IV: ICD-10-PCS | Mod: PBBFAC,,, | Performed by: STUDENT IN AN ORGANIZED HEALTH CARE EDUCATION/TRAINING PROGRAM

## 2022-08-05 RX ORDER — SILDENAFIL CITRATE 20 MG/1
TABLET ORAL
Qty: 50 TABLET | Refills: 11 | Status: SHIPPED | OUTPATIENT
Start: 2022-08-05

## 2022-08-05 NOTE — PATIENT INSTRUCTIONS
I counseled the patient on the proper way to take Viagra/Sildenafil:  - take viagra/sildenafil about 30 min - 1 hour before desired sexual activity  - it is best to take on an empty stomach (no food 2 hours prior)  - after you take viagra/sildenafil, try to engage in stimulating activity  - avoid alcohol around the time of desired sexual activity      The dose for brand Viagra is 100mg. The generic viagra is known as sildenafil and I prescribe it in 20mg tablets.    I typically  my patients to start with 3 tablets at a time (on an empty stomach) for a total of 60mg.  If 3 tablets at a time produces a satisfactory erection for sexual activity, then continue with 3 tablets.     If 3 tablets DOES NOT produce a satisfactory erection for sexual activity, then take 4 tablets at a time for a total of 80mg.  If 4 tablets at a time produces a satisfactory erection for sexual activity, then continue with 4 tablets.     If 4 tablets DOES NOT produce a satisfactory erection for sexual activity, then take 5 tablets at a time for a total of 100mg.  You cannot take more than 5 tablets at a time because that is the maximum dose recommended.

## 2022-08-05 NOTE — Clinical Note
Please work with pt to get his bowel movements to one daily that is a soft bowel movement in consistently. I believe this is a major contributor to his urinary issues.

## 2022-08-05 NOTE — PROGRESS NOTES
Subjective:       Patient ID: Jl Haynes is a 41 y.o. male.    Chief Complaint: Hesitancy of micturition  This is a 41 y.o.  male patient that is new to me.  The patient was referred to me by PCP Dr. Mcintosh for urinary sx. Patient's history is a bit hard to follow and difficult to figure out which provided tried what therapy. But it sounds like nothing has helped as best as I can tell.     Testicle pain -    He has a longstanding hx of left testicle pain, evaluated in past without any abnormal findings in US that was last in the system about 10 years ago. Was told a surgery could help but I am unsure what surgery that might be.  Looks like Dr. David tried doxy in 2014. Brielle koenig NP tried amitriptyline, nsaids, scrotal support in 3/2019.     Bothersome urinary sx. The referral was associated with hesitancy but pt is not complaining of hesitancy. He has urinary urgency and after he finishes urinating he states he has a feeling of urgency and will go back to urinate a second time and it is only drops of urine. He states nothing has helped this, cites medications in the past but not sure who prescribed what medication. Looks like brielle koenig tried flomax in 12/2018 but pt sounds like experienced light headedness and passed out possible from flomax.     ED - he notes he has erections but not firm as they used to. Doesn't last as long as they used to. Tried sildenafil in the past from brielle koenig 3/13/19.     BM - once every 3 days.     He builds scaffolds for construction        Lab Results   Component Value Date    HGBA1C 7.4 (H) 08/02/2022         LAST PSA  Lab Results   Component Value Date    PSA 0.84 10/05/2021    PSA 0.72 05/28/2018    PSA 0.99 04/06/2017    PSA 1.1 05/23/2016    PSA 0.68 02/15/2013    PSA 0.89 11/12/2009       Lab Results   Component Value Date    CREATININE 1.1 08/02/2022       ---  Past Medical History:   Diagnosis Date    Allergy     Depression     Enlarged prostate     Essential  hypertension 2021    HTN (hypertension)     Hyperlipidemia     Type 2 diabetes mellitus with hyperosmolarity without coma, without long-term current use of insulin 2021       Past Surgical History:   Procedure Laterality Date    UMBILICAL HERNIA REPAIR         Family History   Problem Relation Age of Onset    Diabetes Mother     Hypertension Mother     Hypertension Father     Diabetes Mellitus Maternal Grandmother     Dementia Maternal Grandmother     Heart attack Maternal Grandfather     Diabetes Mellitus Maternal Uncle     Prostate cancer Neg Hx     Kidney disease Neg Hx        Social History     Tobacco Use    Smoking status: Former Smoker     Quit date:      Years since quittin.5    Smokeless tobacco: Never Used   Substance Use Topics    Alcohol use: Yes     Alcohol/week: 1.0 standard drink     Types: 1 Standard drinks or equivalent per week     Comment: Rare social occasions.    Drug use: Not Currently     Types: Marijuana       Current Outpatient Medications on File Prior to Visit   Medication Sig Dispense Refill    losartan (COZAAR) 100 MG tablet Take 1 tablet (100 mg total) by mouth once daily. 90 tablet 3    metFORMIN (GLUCOPHAGE) 500 MG tablet Take 1 tablet (500 mg total) by mouth 2 (two) times daily with meals. 90 tablet 3    pravastatin (PRAVACHOL) 20 MG tablet Take 1 tablet (20 mg total) by mouth once daily. 90 tablet 3    semaglutide (RYBELSUS) 3 mg tablet Take 1 tablet (3 mg total) by mouth once daily. 30 tablet 2    [DISCONTINUED] cholecalciferol, vitamin D3, (VITAMIN D3) 50 mcg (2,000 unit) Cap capsule Take 1 capsule (2,000 Units total) by mouth once daily.      [DISCONTINUED] clotrimazole (LOTRIMIN) 1 % cream Apply topically 2 (two) times daily. 60 g 0    [DISCONTINUED] docusate sodium (COLACE) 100 MG capsule Take 1 capsule (100 mg total) by mouth 2 (two) times daily as needed for Constipation. 30 capsule 2     No current facility-administered medications  on file prior to visit.       Review of patient's allergies indicates:   Allergen Reactions    Penicillins Anaphylaxis       Review of Systems   Constitutional: Negative for chills.   HENT: Negative for congestion.    Eyes: Negative for visual disturbance.   Respiratory: Negative for shortness of breath.    Cardiovascular: Negative for chest pain.   Gastrointestinal: Negative for abdominal distention.   Musculoskeletal: Negative for gait problem.   Skin: Negative for color change.   Neurological: Negative for dizziness.   Psychiatric/Behavioral: Negative for agitation.       Objective:      Physical Exam  Constitutional:       Appearance: He is well-developed.   HENT:      Head: Normocephalic.   Eyes:      Pupils: Pupils are equal, round, and reactive to light.   Pulmonary:      Effort: Pulmonary effort is normal.   Abdominal:      Palpations: Abdomen is soft.   Genitourinary:     Comments: Small prostate gland, no nodules, nonboggy, no swelling  Musculoskeletal:         General: Normal range of motion.      Cervical back: Normal range of motion.   Skin:     General: Skin is warm and dry.   Neurological:      Mental Status: He is alert.         Assessment:       1. Erectile dysfunction, unspecified erectile dysfunction type    2. Pain in left testicle    3. Urinary urgency        Plan:       Plan:  1. Pt desires a repeat US testicles for left testicle pain evaluation.  to call.   2. ED - Sildenafil as needed. Sent to [facility].   3. Needs to work on constipation to help with urinary sx.     Numerous complaints/issues, numerous urologic history to review. Coordination of care after visit with pcp. Level 5 visit.      Erectile dysfunction, unspecified erectile dysfunction type  -     sildenafil (REVATIO) 20 mg Tab; Take 3, 4, or 5 tablets at a time (as directed by provider) 1 hour before sexual activity  Dispense: 50 tablet; Refill: 11    Pain in left testicle  -     US Scrotum And Testicles;  Future; Expected date: 08/05/2022    Urinary urgency  -     Ambulatory referral/consult to Urology

## 2022-08-11 ENCOUNTER — TELEPHONE (OUTPATIENT)
Dept: FAMILY MEDICINE | Facility: CLINIC | Age: 42
End: 2022-08-11
Payer: COMMERCIAL

## 2022-08-11 RX ORDER — DOCUSATE SODIUM 100 MG/1
200 CAPSULE, LIQUID FILLED ORAL DAILY
Qty: 180 CAPSULE | Refills: 2 | Status: SHIPPED | OUTPATIENT
Start: 2022-08-11 | End: 2023-11-22

## 2022-08-11 NOTE — TELEPHONE ENCOUNTER
----- Message from Jennifer Bocanegra MD sent at 8/5/2022 10:08 AM CDT -----  Please work with pt to get his bowel movements to one daily that is a soft bowel movement in consistently. I believe this is a major contributor to his urinary issues.

## 2022-08-12 ENCOUNTER — PATIENT OUTREACH (OUTPATIENT)
Dept: ADMINISTRATIVE | Facility: HOSPITAL | Age: 42
End: 2022-08-12
Payer: COMMERCIAL

## 2022-08-24 ENCOUNTER — PATIENT MESSAGE (OUTPATIENT)
Dept: UROLOGY | Facility: CLINIC | Age: 42
End: 2022-08-24
Payer: COMMERCIAL

## 2022-08-24 ENCOUNTER — HOSPITAL ENCOUNTER (OUTPATIENT)
Dept: RADIOLOGY | Facility: HOSPITAL | Age: 42
Discharge: HOME | End: 2022-08-24
Attending: STUDENT IN AN ORGANIZED HEALTH CARE EDUCATION/TRAINING PROGRAM
Payer: COMMERCIAL

## 2022-08-24 DIAGNOSIS — N50.812 PAIN IN LEFT TESTICLE: ICD-10-CM

## 2022-08-24 PROCEDURE — 76870 US SCROTUM AND TESTICLES: ICD-10-PCS | Mod: 26,,, | Performed by: RADIOLOGY

## 2022-08-24 PROCEDURE — 76870 US EXAM SCROTUM: CPT | Mod: TC

## 2022-08-24 PROCEDURE — 76870 US EXAM SCROTUM: CPT | Mod: 26,,, | Performed by: RADIOLOGY

## 2022-10-07 DIAGNOSIS — E11.00 TYPE 2 DIABETES MELLITUS WITH HYPEROSMOLARITY WITHOUT COMA, WITHOUT LONG-TERM CURRENT USE OF INSULIN: ICD-10-CM

## 2022-10-07 RX ORDER — ORAL SEMAGLUTIDE 3 MG/1
3 TABLET ORAL DAILY
Qty: 30 TABLET | Refills: 2 | Status: SHIPPED | OUTPATIENT
Start: 2022-10-07 | End: 2022-11-04

## 2022-10-07 RX ORDER — METFORMIN HYDROCHLORIDE 500 MG/1
500 TABLET ORAL 2 TIMES DAILY WITH MEALS
Qty: 90 TABLET | Refills: 3
Start: 2022-10-07 | End: 2023-01-17 | Stop reason: SDUPTHER

## 2022-10-07 NOTE — TELEPHONE ENCOUNTER
----- Message from Gilmar Day sent at 10/7/2022  2:27 PM CDT -----  Contact: pt  Type: Requesting refill     Who Called: PT    Regarding: tablet 90 tablet   Sig - Route: Take 1 tablet (500 mg total) by mouth 2 (two) times daily with meals.    semaglutide (RYBELSUS) 3 mg tablet 30 tablet   Sig - Route: Take 1 tablet (3 mg total) by mouth once daily. - Oral      Would the patient rather a call back or a response via [facility]? Call back  Best Call Back Number: [phone]    Pharmacy Information: [id number] - [place], [place] - [address] AT [address]   Phone: [phone]  Fax:  [phone]    Additional information: Would to speak with nurse/MA about the medications.

## 2022-10-07 NOTE — TELEPHONE ENCOUNTER
No new care gaps identified.  [facility] Embedded Care Gaps. Reference number: [id number]. 10/07/2022   4:49:07 PM CDT

## 2023-01-17 ENCOUNTER — TELEPHONE (OUTPATIENT)
Dept: FAMILY MEDICINE | Facility: CLINIC | Age: 43
End: 2023-01-17
Payer: COMMERCIAL

## 2023-01-17 ENCOUNTER — PATIENT MESSAGE (OUTPATIENT)
Dept: FAMILY MEDICINE | Facility: CLINIC | Age: 43
End: 2023-01-17
Payer: COMMERCIAL

## 2023-01-17 DIAGNOSIS — E11.00 TYPE 2 DIABETES MELLITUS WITH HYPEROSMOLARITY WITHOUT COMA, WITHOUT LONG-TERM CURRENT USE OF INSULIN: ICD-10-CM

## 2023-01-17 DIAGNOSIS — E11.00 TYPE 2 DIABETES MELLITUS WITH HYPEROSMOLARITY WITHOUT COMA, WITHOUT LONG-TERM CURRENT USE OF INSULIN: Primary | ICD-10-CM

## 2023-01-17 RX ORDER — METFORMIN HYDROCHLORIDE 500 MG/1
500 TABLET ORAL 2 TIMES DAILY WITH MEALS
Qty: 90 TABLET | Refills: 3
Start: 2023-01-17 | End: 2023-01-17 | Stop reason: SDUPTHER

## 2023-01-17 RX ORDER — METFORMIN HYDROCHLORIDE 500 MG/1
500 TABLET ORAL 2 TIMES DAILY WITH MEALS
Qty: 90 TABLET | Refills: 3 | Status: SHIPPED | OUTPATIENT
Start: 2023-01-17 | End: 2023-10-24

## 2023-01-17 NOTE — TELEPHONE ENCOUNTER
Care Due:                  Date            Visit Type   Department     Provider  --------------------------------------------------------------------------------                                MYCHART                              FOLLOWUP/OF  [facility]  Last Visit: 08-      FICE VISIT   MEDICINE       Kimberly Mcintosh                              EP -                              PRIMARY      [facility]  Next Visit: 04-      CARE ([facility])   [facility]       Kimberly Mcintosh                                                            Last  Test          Frequency    Reason                     Performed    Due Date  --------------------------------------------------------------------------------    HBA1C.......  6 months...  metFORMIN, semaglutide...  08- 01-    [facility] Embedded Care Gaps. Reference number: [id number]. 1/17/2023   2:51:37 PM CST

## 2023-01-18 ENCOUNTER — LAB VISIT (OUTPATIENT)
Dept: LAB | Facility: HOSPITAL | Age: 43
End: 2023-01-18
Attending: INTERNAL MEDICINE
Payer: COMMERCIAL

## 2023-01-18 DIAGNOSIS — E11.00 TYPE 2 DIABETES MELLITUS WITH HYPEROSMOLARITY WITHOUT COMA, WITHOUT LONG-TERM CURRENT USE OF INSULIN: ICD-10-CM

## 2023-01-18 LAB
ALBUMIN/CREAT UR: 5.6 UG/MG (ref 0–30)
CREAT UR-MCNC: 108 MG/DL (ref 23–375)
MICROALBUMIN UR DL<=1MG/L-MCNC: 6 UG/ML

## 2023-01-18 PROCEDURE — 82570 ASSAY OF URINE CREATININE: CPT | Performed by: INTERNAL MEDICINE

## 2023-01-19 ENCOUNTER — PATIENT MESSAGE (OUTPATIENT)
Dept: FAMILY MEDICINE | Facility: CLINIC | Age: 43
End: 2023-01-19
Payer: COMMERCIAL

## 2023-08-02 DIAGNOSIS — E11.9 TYPE 2 DIABETES MELLITUS WITHOUT COMPLICATION: ICD-10-CM

## 2023-09-13 DIAGNOSIS — E11.9 TYPE 2 DIABETES MELLITUS WITHOUT COMPLICATION: ICD-10-CM

## 2023-09-13 DIAGNOSIS — E11.9 TYPE 2 DIABETES MELLITUS WITHOUT COMPLICATION, UNSPECIFIED WHETHER LONG TERM INSULIN USE: ICD-10-CM

## 2023-09-14 ENCOUNTER — PATIENT MESSAGE (OUTPATIENT)
Dept: ADMINISTRATIVE | Facility: HOSPITAL | Age: 43
End: 2023-09-14
Payer: COMMERCIAL

## 2023-09-18 ENCOUNTER — PATIENT MESSAGE (OUTPATIENT)
Dept: ADMINISTRATIVE | Facility: HOSPITAL | Age: 43
End: 2023-09-18
Payer: COMMERCIAL

## 2023-10-15 DIAGNOSIS — E78.2 MIXED HYPERLIPIDEMIA: ICD-10-CM

## 2023-10-15 NOTE — TELEPHONE ENCOUNTER
Care Due:                  Date            Visit Type   Department     Provider  --------------------------------------------------------------------------------                                MYCHART                              FOLLOWUP/OF  [facility]  Last Visit: 08-      FICE VISIT   MEDICINE       Kimberly Mcintosh                              EP -                              PRIMARY      [facility]  Next Visit: 12-      CARE ([facility])   MEDICINE       Kimberly Mcintosh                                                            Last  Test          Frequency    Reason                     Performed    Due Date  --------------------------------------------------------------------------------    CMP.........  12 months..  empagliflozin, losartan,   01- 01-                             metFORMIN, pravastatin...    HBA1C.......  6 months...  empagliflozin, metFORMIN.  01- 07-    Lipid Panel.  12 months..  pravastatin..............  08- 07-    [facility] Embedded Care Due Messages. Reference number: [id number].   10/15/2023 12:38:29 PM CDT

## 2023-10-16 RX ORDER — PRAVASTATIN SODIUM 20 MG/1
TABLET ORAL
Qty: 90 TABLET | Refills: 0 | Status: SHIPPED | OUTPATIENT
Start: 2023-10-16 | End: 2023-11-22 | Stop reason: SDUPTHER

## 2023-10-16 NOTE — TELEPHONE ENCOUNTER
Refill Routing Note   Medication(s) are not appropriate for processing by [facility] for the following reason(s):      Required labs outdated    [facility] action(s):  Defer Care Due:  Labs due            Appointments  past 12m or future 3m with PCP    Date Provider   Last Visit   8/2/2022 Kimberly Mcintosh MD   Next Visit   12/20/2023 Kimberly Mcintosh MD   ED visits in past 90 days: 0        Note composed:5:27 PM 10/16/2023

## 2023-10-23 DIAGNOSIS — E11.00 TYPE 2 DIABETES MELLITUS WITH HYPEROSMOLARITY WITHOUT COMA, WITHOUT LONG-TERM CURRENT USE OF INSULIN: ICD-10-CM

## 2023-10-23 NOTE — TELEPHONE ENCOUNTER
No care due was identified.  [facility] Embedded Care Due Messages. Reference number: [id number].   10/23/2023 9:34:07 AM CDT

## 2023-10-24 DIAGNOSIS — E11.00 TYPE 2 DIABETES MELLITUS WITH HYPEROSMOLARITY WITHOUT COMA, WITHOUT LONG-TERM CURRENT USE OF INSULIN: ICD-10-CM

## 2023-10-24 RX ORDER — METFORMIN HYDROCHLORIDE 500 MG/1
500 TABLET ORAL 2 TIMES DAILY WITH MEALS
Qty: 180 TABLET | Refills: 0 | Status: SHIPPED | OUTPATIENT
Start: 2023-10-24 | End: 2023-10-24 | Stop reason: SDUPTHER

## 2023-10-24 RX ORDER — METFORMIN HYDROCHLORIDE 500 MG/1
500 TABLET ORAL 2 TIMES DAILY WITH MEALS
Qty: 180 TABLET | Refills: 3 | Status: SHIPPED | OUTPATIENT
Start: 2023-10-24 | End: 2023-11-22 | Stop reason: SDUPTHER

## 2023-10-24 NOTE — TELEPHONE ENCOUNTER
Refill Routing Note   Medication(s) are not appropriate for processing by [facility] for the following reason(s):      Required labs outdated    [facility] action(s):  Defer Care Due:  None identified            Appointments  past 12m or future 3m with PCP    Date Provider   Last Visit   8/2/2022 Kimberly Mcintosh MD   Next Visit   12/20/2023 Kimberly Mcintosh MD   ED visits in past 90 days: 0        Note composed:4:09 AM 10/24/2023

## 2023-10-24 NOTE — TELEPHONE ENCOUNTER
No care due was identified.  [facility] Embedded Care Due Messages. Reference number: [id number].   10/24/2023 9:34:11 AM CDT

## 2023-11-21 ENCOUNTER — TELEPHONE (OUTPATIENT)
Dept: FAMILY MEDICINE | Facility: CLINIC | Age: 43
End: 2023-11-21
Payer: COMMERCIAL

## 2023-11-21 NOTE — TELEPHONE ENCOUNTER
----- Message from Lavern Carr sent at 11/21/2023  1:14 PM CST -----  Type:  Needs Medical Advice    Who Called: pt  Would the patient rather a call back or a response via [facility]? call  Best Call Back Number:  [phone]  Additional Information: pt states he won't be able to make 12/20 appt would like to see if it's possible to get blood work scheduled for tomorrow without seeing dr. Mcintosh for medication refill purposes says [place] getting ready to leave again

## 2023-11-22 ENCOUNTER — LAB VISIT (OUTPATIENT)
Dept: LAB | Facility: HOSPITAL | Age: 43
End: 2023-11-22
Attending: INTERNAL MEDICINE
Payer: COMMERCIAL

## 2023-11-22 ENCOUNTER — OFFICE VISIT (OUTPATIENT)
Dept: FAMILY MEDICINE | Facility: CLINIC | Age: 43
End: 2023-11-22
Payer: COMMERCIAL

## 2023-11-22 VITALS
WEIGHT: 248.25 LBS | SYSTOLIC BLOOD PRESSURE: 116 MMHG | OXYGEN SATURATION: 99 % | HEART RATE: 75 BPM | HEIGHT: 72 IN | DIASTOLIC BLOOD PRESSURE: 78 MMHG | BODY MASS INDEX: 33.62 KG/M2

## 2023-11-22 DIAGNOSIS — I10 ESSENTIAL HYPERTENSION: Primary | ICD-10-CM

## 2023-11-22 DIAGNOSIS — B35.3 TINEA PEDIS, UNSPECIFIED LATERALITY: ICD-10-CM

## 2023-11-22 DIAGNOSIS — I10 ESSENTIAL HYPERTENSION: ICD-10-CM

## 2023-11-22 DIAGNOSIS — E11.9 TYPE 2 DIABETES MELLITUS WITHOUT COMPLICATION: ICD-10-CM

## 2023-11-22 DIAGNOSIS — E78.2 MIXED HYPERLIPIDEMIA: ICD-10-CM

## 2023-11-22 DIAGNOSIS — E11.00 TYPE 2 DIABETES MELLITUS WITH HYPEROSMOLARITY WITHOUT COMA, WITHOUT LONG-TERM CURRENT USE OF INSULIN: ICD-10-CM

## 2023-11-22 DIAGNOSIS — Z87.438 H/O BALANITIS: ICD-10-CM

## 2023-11-22 LAB
ALBUMIN SERPL BCP-MCNC: 4.6 G/DL (ref 3.5–5.2)
ALBUMIN/CREAT UR: 7 UG/MG (ref 0–30)
ALP SERPL-CCNC: 71 U/L (ref 55–135)
ALT SERPL W/O P-5'-P-CCNC: 73 U/L (ref 10–44)
ANION GAP SERPL CALC-SCNC: 10 MMOL/L (ref 8–16)
AST SERPL-CCNC: 40 U/L (ref 10–40)
BASOPHILS # BLD AUTO: 0.04 K/UL (ref 0–0.2)
BASOPHILS NFR BLD: 0.5 % (ref 0–1.9)
BILIRUB SERPL-MCNC: 0.9 MG/DL (ref 0.1–1)
BUN SERPL-MCNC: 7 MG/DL (ref 6–20)
CALCIUM SERPL-MCNC: 10.1 MG/DL (ref 8.7–10.5)
CHLORIDE SERPL-SCNC: 105 MMOL/L (ref 95–110)
CHOLEST SERPL-MCNC: 186 MG/DL (ref 120–199)
CHOLEST SERPL-MCNC: 186 MG/DL (ref 120–199)
CHOLEST/HDLC SERPL: 5.2 {RATIO} (ref 2–5)
CHOLEST/HDLC SERPL: 5.2 {RATIO} (ref 2–5)
CO2 SERPL-SCNC: 26 MMOL/L (ref 23–29)
CREAT SERPL-MCNC: 1.2 MG/DL (ref 0.5–1.4)
CREAT UR-MCNC: 186 MG/DL (ref 23–375)
DIFFERENTIAL METHOD: NORMAL
EOSINOPHIL # BLD AUTO: 0.1 K/UL (ref 0–0.5)
EOSINOPHIL NFR BLD: 1.1 % (ref 0–8)
ERYTHROCYTE [DISTWIDTH] IN BLOOD BY AUTOMATED COUNT: 12.9 % (ref 11.5–14.5)
EST. GFR  (NO RACE VARIABLE): >60 ML/MIN/1.73 M^2
ESTIMATED AVG GLUCOSE: 177 MG/DL (ref 68–131)
GLUCOSE SERPL-MCNC: 122 MG/DL (ref 70–110)
HBA1C MFR BLD: 7.8 % (ref 4–5.6)
HCT VFR BLD AUTO: 43.1 % (ref 40–54)
HDLC SERPL-MCNC: 36 MG/DL (ref 40–75)
HDLC SERPL-MCNC: 36 MG/DL (ref 40–75)
HDLC SERPL: 19.4 % (ref 20–50)
HDLC SERPL: 19.4 % (ref 20–50)
HGB BLD-MCNC: 15 G/DL (ref 14–18)
IMM GRANULOCYTES # BLD AUTO: 0.02 K/UL (ref 0–0.04)
IMM GRANULOCYTES NFR BLD AUTO: 0.3 % (ref 0–0.5)
LDLC SERPL CALC-MCNC: 113.2 MG/DL (ref 63–159)
LDLC SERPL CALC-MCNC: 113.2 MG/DL (ref 63–159)
LYMPHOCYTES # BLD AUTO: 2.6 K/UL (ref 1–4.8)
LYMPHOCYTES NFR BLD: 35.4 % (ref 18–48)
MCH RBC QN AUTO: 30.1 PG (ref 27–31)
MCHC RBC AUTO-ENTMCNC: 34.8 G/DL (ref 32–36)
MCV RBC AUTO: 86 FL (ref 82–98)
MICROALBUMIN UR DL<=1MG/L-MCNC: 13 UG/ML
MONOCYTES # BLD AUTO: 0.8 K/UL (ref 0.3–1)
MONOCYTES NFR BLD: 10.1 % (ref 4–15)
NEUTROPHILS # BLD AUTO: 3.9 K/UL (ref 1.8–7.7)
NEUTROPHILS NFR BLD: 52.6 % (ref 38–73)
NONHDLC SERPL-MCNC: 150 MG/DL
NONHDLC SERPL-MCNC: 150 MG/DL
NRBC BLD-RTO: 0 /100 WBC
PLATELET # BLD AUTO: 243 K/UL (ref 150–450)
PMV BLD AUTO: 9.2 FL (ref 9.2–12.9)
POTASSIUM SERPL-SCNC: 4.1 MMOL/L (ref 3.5–5.1)
PROT SERPL-MCNC: 8 G/DL (ref 6–8.4)
RBC # BLD AUTO: 4.99 M/UL (ref 4.6–6.2)
SODIUM SERPL-SCNC: 141 MMOL/L (ref 136–145)
TRIGL SERPL-MCNC: 184 MG/DL (ref 30–150)
TRIGL SERPL-MCNC: 184 MG/DL (ref 30–150)
TSH SERPL DL<=0.005 MIU/L-ACNC: 1.25 UIU/ML (ref 0.4–4)
WBC # BLD AUTO: 7.45 K/UL (ref 3.9–12.7)

## 2023-11-22 PROCEDURE — 80053 COMPREHEN METABOLIC PANEL: CPT | Performed by: INTERNAL MEDICINE

## 2023-11-22 PROCEDURE — 80061 LIPID PANEL: CPT | Performed by: INTERNAL MEDICINE

## 2023-11-22 PROCEDURE — 36415 COLL VENOUS BLD VENIPUNCTURE: CPT | Performed by: INTERNAL MEDICINE

## 2023-11-22 PROCEDURE — 90471 IMMUNIZATION ADMIN: CPT | Mod: S$GLB,,, | Performed by: INTERNAL MEDICINE

## 2023-11-22 PROCEDURE — 84443 ASSAY THYROID STIM HORMONE: CPT | Performed by: INTERNAL MEDICINE

## 2023-11-22 PROCEDURE — 85025 COMPLETE CBC W/AUTO DIFF WBC: CPT | Performed by: INTERNAL MEDICINE

## 2023-11-22 PROCEDURE — 99999 PR PBB SHADOW E&M-EST. PATIENT-LVL IV: ICD-10-PCS | Mod: PBBFAC,,, | Performed by: INTERNAL MEDICINE

## 2023-11-22 PROCEDURE — 90686 FLU VACCINE (QUAD) GREATER THAN OR EQUAL TO 3YO PRESERVATIVE FREE IM: ICD-10-PCS | Mod: S$GLB,,, | Performed by: INTERNAL MEDICINE

## 2023-11-22 PROCEDURE — 82043 UR ALBUMIN QUANTITATIVE: CPT | Performed by: INTERNAL MEDICINE

## 2023-11-22 PROCEDURE — 99214 PR OFFICE/OUTPT VISIT, EST, LEVL IV, 30-39 MIN: ICD-10-PCS | Mod: 25,S$GLB,, | Performed by: INTERNAL MEDICINE

## 2023-11-22 PROCEDURE — 83036 HEMOGLOBIN GLYCOSYLATED A1C: CPT | Performed by: INTERNAL MEDICINE

## 2023-11-22 PROCEDURE — 99999 PR PBB SHADOW E&M-EST. PATIENT-LVL IV: CPT | Mod: PBBFAC,,, | Performed by: INTERNAL MEDICINE

## 2023-11-22 PROCEDURE — 90686 IIV4 VACC NO PRSV 0.5 ML IM: CPT | Mod: S$GLB,,, | Performed by: INTERNAL MEDICINE

## 2023-11-22 PROCEDURE — 90471 FLU VACCINE (QUAD) GREATER THAN OR EQUAL TO 3YO PRESERVATIVE FREE IM: ICD-10-PCS | Mod: S$GLB,,, | Performed by: INTERNAL MEDICINE

## 2023-11-22 PROCEDURE — 99214 OFFICE O/P EST MOD 30 MIN: CPT | Mod: 25,S$GLB,, | Performed by: INTERNAL MEDICINE

## 2023-11-22 RX ORDER — METFORMIN HYDROCHLORIDE 1000 MG/1
1000 TABLET ORAL 2 TIMES DAILY WITH MEALS
Qty: 180 TABLET | Refills: 3 | Status: SHIPPED | OUTPATIENT
Start: 2023-11-22

## 2023-11-22 RX ORDER — PRAVASTATIN SODIUM 40 MG/1
40 TABLET ORAL DAILY
Qty: 90 TABLET | Refills: 3 | Status: SHIPPED | OUTPATIENT
Start: 2023-11-22

## 2023-11-22 RX ORDER — CICLOPIROX 80 MG/ML
SOLUTION TOPICAL NIGHTLY
Qty: 6 ML | Refills: 6 | Status: SHIPPED | OUTPATIENT
Start: 2023-11-22

## 2023-11-22 RX ORDER — CLOTRIMAZOLE AND BETAMETHASONE DIPROPIONATE 10; .64 MG/G; MG/G
CREAM TOPICAL 2 TIMES DAILY
Qty: 45 G | Refills: 5 | Status: SHIPPED | OUTPATIENT
Start: 2023-11-22

## 2023-11-22 RX ORDER — LOSARTAN POTASSIUM 100 MG/1
100 TABLET ORAL DAILY
Qty: 90 TABLET | Refills: 3 | Status: SHIPPED | OUTPATIENT
Start: 2023-11-22

## 2023-11-22 NOTE — PROGRESS NOTES
Subjective:       Patient ID: Jl Haynes is a 43 y.o. male.    Chief Complaint: Follow-up      HPI  Jl Haynes is a 43 y.o. male with diabetes mellitus type 2, hypertension, hyperlipidemia, and enlarged prostate   who presents today for Follow-up    Reports continues to work of sure and has limited options for his diet did not afford Jardiance or semaglutide.  Has only been taking metformin.  Accu-Chek has been elevated, earlier was 180.  His increasing weight which in part is due to diet.    Reports occasional itching of skin but specially the genital area.  No discharge.  Itching is mostly on prepuce area.    Blood pressure has been good.  Denies headaches, chest pains, palpitations, or lightheadedness.  No increased thirst sometimes urinates often but drinking fluids.    Had COVID again, mild symptoms.    Labs showing elevated fasting glucose and increasing A1c.  Triglycerides elevated and LDL not at target.  Thyroid function looks good.     Health Maintenance:  Health Maintenance   Topic Date Due    Foot Exam  02/07/2023    Eye Exam  06/28/2023    Hemoglobin A1c  07/18/2023    Lipid Panel  08/02/2023    Low Dose Statin  10/16/2024    TETANUS VACCINE  11/05/2031    Hepatitis C Screening  Completed       Review of Systems   Constitutional:  Positive for unexpected weight change. Negative for fever.   HENT: Negative.     Respiratory: Negative.     Cardiovascular: Negative.    Gastrointestinal: Negative.    Genitourinary:  Negative for difficulty urinating, discharge and dysuria.        Itching of prepuce area   Musculoskeletal: Negative.    Integumentary:  Negative.   Neurological: Negative.    Psychiatric/Behavioral: Negative.        Past Medical History:   Diagnosis Date    Allergy     Depression     Enlarged prostate     Essential hypertension 11/05/2021    HTN (hypertension)     Hyperlipidemia     Type 2 diabetes mellitus with hyperosmolarity without coma, without long-term current use of insulin 11/05/2021        Past Surgical History:   Procedure Laterality Date    UMBILICAL HERNIA REPAIR         Family History   Problem Relation Age of Onset    Diabetes Mother     Hypertension Mother     Hypertension Father     Diabetes Mellitus Maternal Grandmother     Dementia Maternal Grandmother     Heart attack Maternal Grandfather     Diabetes Mellitus Maternal Uncle     Prostate cancer Neg Hx     Kidney disease Neg Hx        Social History     Socioeconomic History    Marital status:    Tobacco Use    Smoking status: Former     Current packs/day: 0.00     Types: Cigarettes     Quit date:      Years since quittin.8    Smokeless tobacco: Never   Substance and Sexual Activity    Alcohol use: Yes     Alcohol/week: 1.0 standard drink of alcohol     Types: 1 Standard drinks or equivalent per week     Comment: Rare social occasions.    Drug use: Not Currently     Types: Marijuana    Sexual activity: Not Currently       Current Outpatient Medications   Medication Sig Dispense Refill    losartan (COZAAR) 100 MG tablet TAKE 1 TABLET(100 MG) BY MOUTH EVERY DAY 90 tablet 3    metFORMIN (GLUCOPHAGE) 500 MG tablet Take 1 tablet (500 mg total) by mouth 2 (two) times daily with meals. 180 tablet 3    docusate sodium (COLACE) 100 MG capsule Take 2 capsules (200 mg total) by mouth once daily at 6am. (Patient not taking: Reported on 2023) 180 capsule 2    empagliflozin (JARDIANCE) 10 mg tablet Take 1 tablet (10 mg total) by mouth once daily. (Patient not taking: Reported on 2023) 90 tablet 1    pravastatin (PRAVACHOL) 20 MG tablet TAKE 1 TABLET(20 MG) BY MOUTH EVERY DAY (Patient not taking: Reported on 2023) 90 tablet 0    sildenafil (REVATIO) 20 mg Tab Take 3, 4, or 5 tablets at a time (as directed by provider) 1 hour before sexual activity (Patient not taking: Reported on 2023) 50 tablet 11     No current facility-administered medications for this visit.       Review of patient's allergies indicates:    Allergen Reactions    Penicillins Anaphylaxis         Objective:       Last 3 sets of Vitals        8/2/2022     9:52 AM 8/5/2022     9:43 AM 11/22/2023     1:19 PM   Vitals - 1 value per visit   SYSTOLIC 130 135 116   DIASTOLIC 86 89 78   Pulse 72 60 75   SPO2 98 %  99 %   Weight (lb) 248.24  248.24   Weight (kg) 112.6  112.6   Height 6' (1.829 m)  6' (1.829 m)   BMI (Calculated) 33.7  33.7   Pain Score Zero Zero Zero   Physical Exam  Exam conducted with a chaperone present.   Constitutional:       General: He is not in acute distress.  HENT:      Head: Normocephalic.      Right Ear: Tympanic membrane, ear canal and external ear normal.      Left Ear: Tympanic membrane, ear canal and external ear normal.      Nose: Nose normal.      Mouth/Throat:      Mouth: Mucous membranes are moist.   Eyes:      General: No scleral icterus.     Extraocular Movements: Extraocular movements intact.      Conjunctiva/sclera: Conjunctivae normal.   Neck:      Vascular: No carotid bruit.      Comments: No goiter.  Cardiovascular:      Rate and Rhythm: Normal rate and regular rhythm.      Pulses: Normal pulses.      Heart sounds: Normal heart sounds.   Pulmonary:      Effort: Pulmonary effort is normal.      Breath sounds: Normal breath sounds.   Abdominal:      General: Bowel sounds are normal. There is no distension.      Palpations: Abdomen is soft. There is no mass.      Tenderness: There is no abdominal tenderness.   Genitourinary:     Comments: Uncircumcised, mild erythema with no significant inflammation, no discharge.  Musculoskeletal:         General: No swelling.   Lymphadenopathy:      Cervical: No cervical adenopathy.   Skin:     General: Skin is warm and dry.   Neurological:      General: No focal deficit present.      Mental Status: He is alert and oriented to person, place, and time.   Psychiatric:         Mood and Affect: Mood normal.         Behavior: Behavior normal.           CBC:  Recent Labs   Lab 10/05/21  1053  10/06/21  1334 08/02/22  1200   WBC 7.08 7.36 6.71   RBC 5.36 5.32 4.98   Hemoglobin 16.0 15.4 14.5   Hematocrit 46.6 45.1 42.2   Platelets 259 219 247   MCV 87 85 85   MCH 29.9 28.9 29.1   MCHC 34.3 34.1 34.4     CMP:  Recent Labs   Lab 02/03/22  1656 08/02/22  1200 01/18/23  0806   Glucose 97  97 113 H 127 H   Calcium 10.2  10.2 10.1 9.4   Albumin 4.5  4.5 4.6 4.2   Total Protein 7.6  7.6 8.0 7.4   Sodium 141  141 143 137   Potassium 4.0  4.0 4.1 4.4   CO2 30 H  30 H 26 25   Chloride 106  106 108 104   BUN 9  9 12 9   Creatinine 1.1  1.1 1.1 1.0   Alkaline Phosphatase 65  65 60 71   ALT 21  21 37 49 H   AST 16  16 22 27   Total Bilirubin 0.6  0.6 0.7 0.6     URINALYSIS:  Recent Labs   Lab 10/06/21  1411 08/02/22  1152   Color, UA Yellow Yellow   Specific Gravity, UA 1.015 1.015   pH, UA 5.0 5.0   Protein, UA Negative Negative   Bacteria None  --    Nitrite, UA Negative Negative   Leukocytes, UA Negative Negative   Urobilinogen, UA Negative Negative   Hyaline Casts, UA 2 A  --       LIPIDS:  Recent Labs   Lab 10/05/21  1053 11/05/21  0950 08/02/22  1200   TSH 1.415  --  1.041   HDL 37 L 36 L 38 L   Cholesterol 220 H 188 162   Triglycerides 462 H 100 139   LDL Cholesterol Invalid, Trig>400.0 132.0 96.2   HDL/Cholesterol Ratio 16.8 L 19.1 L 23.5   Non-HDL Cholesterol 183 152 124   Total Cholesterol/HDL Ratio 5.9 H 5.2 H 4.3     TSH:  Recent Labs   Lab 10/05/21  1053 08/02/22  1200   TSH 1.415 1.041       A1C:  Recent Labs   Lab 10/05/21  1053 11/05/21  0950 02/03/22  1656 08/02/22  1200 01/18/23  0806   Hemoglobin A1C 13.8 H 10.6 H 6.5 H 7.4 H 6.3 H       Imaging:  US Scrotum And Testicles  Narrative: EXAMINATION:  US SCROTUM AND TESTICLES    CLINICAL HISTORY:  scrotal pain on left; Left testicular pain.    TECHNIQUE:  Sonography of the scrotum and testes.    COMPARISON:  02/06/2012.    FINDINGS:  Right Testicle:    *Size: 4.6 x 2.2 x 3.2 cm  *Appearance: Normal.  *Flow: Normal arterial and venous  flow  *Epididymis: Normal.  *Hydrocele: Trace.  *Varicocele: None.  Left Testicle:    *Size: 4.5 x 2.2 x 3.1 cm  *Appearance: Normal.  *Flow: Normal arterial and venous flow  *Epididymis: Normal.  *Hydrocele: Trace.  *Varicocele: None.  Other findings: Jvco-de-hdna color Doppler imaging through both testicles demonstrates normal and symmetric vascularity.  Impression: No significant sonographic abnormality identified in either testicle.    Electronically signed by: Constantin Pendleton MD  Date:    08/24/2022  Time:    13:44      Assessment:       1. Essential hypertension    2. Type 2 diabetes mellitus with hyperosmolarity without coma, without long-term current use of insulin    3. Mixed hyperlipidemia    4. H/O balanitis    5. Tinea pedis, unspecified laterality            Plan:       1. Essential hypertension  -     CBC Auto Differential; Future; Expected date: 11/22/2023  -     Comprehensive Metabolic Panel; Future; Expected date: 11/22/2023  -     TSH; Future; Expected date: 11/22/2023  -     losartan (COZAAR) 100 MG tablet; Take 1 tablet (100 mg total) by mouth once daily.  Dispense: 90 tablet; Refill: 3  - controlled blood pressure, same treatment    2. Type 2 diabetes mellitus with hyperosmolarity without coma, without long-term current use of insulin  -     Comprehensive Metabolic Panel; Future; Expected date: 11/22/2023  -     Hemoglobin A1C; Future; Expected date: 11/22/2023  -     Microalbumin/Creatinine Ratio, Urine; Future; Expected date: 11/22/2023  -     metFORMIN (GLUCOPHAGE) 1000 MG tablet; Take 1 tablet (1,000 mg total) by mouth 2 (two) times daily with meals.  Dispense: 180 tablet; Refill: 3  -     glimepiride (AMARYL) 1 MG tablet; Take 1 tablet (1 mg total) by mouth before breakfast.  Dispense: 90 tablet; Refill: 3  - A1cs increasing.  Metformin was increased and will add low-dose glimepiride which should be more available.  If glucose decreases can hold glimepiride cut in half.    3. Mixed  hyperlipidemia  -     Lipid Panel; Future; Expected date: 11/22/2023  -     pravastatin (PRAVACHOL) 40 MG tablet; Take 1 tablet (40 mg total) by mouth once daily.  Dispense: 90 tablet; Refill: 3  - LDL not at target and pravastatin increase.  May need to change to atorvastatin as his triglycerides are high also but with control of diabetes may improve.    4. H/O balanitis  -     clotrimazole-betamethasone 1-0.05% (LOTRISONE) cream; Apply topically 2 (two) times daily.  Dispense: 45 g; Refill: 5  -     Ambulatory referral/consult to Urology; Future; Expected date: 11/22/2023    5. Tinea pedis, unspecified laterality  -     clotrimazole-betamethasone 1-0.05% (LOTRISONE) cream; Apply topically 2 (two) times daily.  Dispense: 45 g; Refill: 5    Other orders  -     Influenza - Quadrivalent *Preferred* (6 months+) (PF)  -     ciclopirox (PENLAC) 8 % Soln; Apply topically nightly.  Dispense: 6 mL; Refill: 6       Health Maintenance Due   Topic Date Due    Pneumococcal Vaccines (Age 0-64) (1 - PCV) Never done    Foot Exam  02/07/2023    Eye Exam  06/28/2023    Hemoglobin A1c  07/18/2023    Lipid Panel  08/02/2023    Influenza Vaccine (1) 09/01/2023    COVID-19 Vaccine (5 - 2023-24 season) 09/01/2023            Return to clinic in 3 months.    Kimberly Mcintosh MD  [facility]  Disclaimer:  This note has been generated using voice-recognition software. There may be grammatical or spelling errors that have been missed during proof-reading

## 2023-11-23 ENCOUNTER — PATIENT MESSAGE (OUTPATIENT)
Dept: FAMILY MEDICINE | Facility: CLINIC | Age: 43
End: 2023-11-23
Payer: COMMERCIAL

## 2023-11-23 DIAGNOSIS — E78.2 MIXED HYPERLIPIDEMIA: ICD-10-CM

## 2023-11-23 DIAGNOSIS — E11.00 TYPE 2 DIABETES MELLITUS WITH HYPEROSMOLARITY WITHOUT COMA, WITHOUT LONG-TERM CURRENT USE OF INSULIN: Primary | ICD-10-CM

## 2023-11-23 RX ORDER — GLIMEPIRIDE 1 MG/1
1 TABLET ORAL
Qty: 90 TABLET | Refills: 3 | Status: SHIPPED | OUTPATIENT
Start: 2023-11-22 | End: 2024-11-21

## 2023-11-24 ENCOUNTER — OFFICE VISIT (OUTPATIENT)
Dept: UROLOGY | Facility: CLINIC | Age: 43
End: 2023-11-24
Payer: COMMERCIAL

## 2023-11-24 VITALS
SYSTOLIC BLOOD PRESSURE: 145 MMHG | WEIGHT: 249.56 LBS | BODY MASS INDEX: 33.8 KG/M2 | HEART RATE: 71 BPM | DIASTOLIC BLOOD PRESSURE: 96 MMHG | HEIGHT: 72 IN

## 2023-11-24 DIAGNOSIS — Z87.438 H/O BALANITIS: ICD-10-CM

## 2023-11-24 PROCEDURE — 99213 PR OFFICE/OUTPT VISIT, EST, LEVL III, 20-29 MIN: ICD-10-PCS | Mod: S$GLB,,, | Performed by: UROLOGY

## 2023-11-24 PROCEDURE — 99999 PR PBB SHADOW E&M-EST. PATIENT-LVL III: ICD-10-PCS | Mod: PBBFAC,,, | Performed by: UROLOGY

## 2023-11-24 PROCEDURE — 99213 OFFICE O/P EST LOW 20 MIN: CPT | Mod: S$GLB,,, | Performed by: UROLOGY

## 2023-11-24 PROCEDURE — 99999 PR PBB SHADOW E&M-EST. PATIENT-LVL III: CPT | Mod: PBBFAC,,, | Performed by: UROLOGY

## 2023-11-24 NOTE — PROGRESS NOTES
Subjective:       Patient ID: Jl Haynes is a 43 y.o. male.    Chief Complaint: foreskin issue     This is a 43 y.o.  male patient that is new to me.    Past patient of Dr. Bocanegra, summary:  H/o LUTs, testicle pain.  Antibiotic, amitriptyline, NSAIDs in past (2019).   Bothersome urinary sx. Had lightheadedness with flomax in past.    ED - he notes he has erections but not firm as they used to. Tried sildenafil in the past from [facility] 3/13/19.     Now establishing with me.  Has irritation with foreskin intermittently, no phimosis or balanitis.      LAST PSA  Lab Results   Component Value Date    PSA 0.84 10/05/2021    PSA 0.72 05/28/2018    PSA 0.99 04/06/2017    PSA 1.1 05/23/2016    PSA 0.68 02/15/2013    PSA 0.89 11/12/2009       Lab Results   Component Value Date    CREATININE 1.2 11/22/2023       ---  PMH/PSH/Medications/Allergies/Social history reviewed and as in chart.    Review of Systems   Constitutional:  Negative for activity change, appetite change, chills and fever.   HENT:  Negative for congestion.    Respiratory:  Negative for cough, chest tightness and shortness of breath.    Cardiovascular:  Negative for chest pain and palpitations.   Gastrointestinal:  Negative for abdominal distention, abdominal pain, nausea, rectal pain and vomiting.   Genitourinary:  Negative for difficulty urinating, flank pain, hematuria, penile pain, scrotal swelling and testicular pain.   Musculoskeletal:  Negative for arthralgias, back pain and gait problem.       Objective:      Physical Exam  HENT:      Head: Atraumatic.   Pulmonary:      Effort: Pulmonary effort is normal.   Genitourinary:     Comments: No phimosis or balanitis    Neurological:      General: No focal deficit present.      Mental Status: He is alert and oriented to person, place, and time.         Assessment:     Problem Noted   H/O Balanitis 11/24/2023       Plan:     Discussed circumcision, would not recommend unless recurrent balanitis or  phimosis.    Risks, benefits and alternatives of circumcision reviewed.    To let me know how wishes to proceed.      Logan Zuñiga MD    The above referenced imaging and interpretations were personally reviewed.  Disclaimer: This note has been generated using voice-recognition software. There may be typographical errors that have been missed during proof-reading.

## 2024-01-30 DIAGNOSIS — E11.00 TYPE 2 DIABETES MELLITUS WITH HYPEROSMOLARITY WITHOUT COMA, WITHOUT LONG-TERM CURRENT USE OF INSULIN: ICD-10-CM

## 2024-01-30 RX ORDER — METFORMIN HYDROCHLORIDE 500 MG/1
500 TABLET ORAL 2 TIMES DAILY WITH MEALS
Qty: 180 TABLET | Refills: 0 | OUTPATIENT
Start: 2024-01-30

## 2024-01-30 NOTE — TELEPHONE ENCOUNTER
No care due was identified.  [facility] Embedded Care Due Messages. Reference number: [id number].   1/30/2024 1:07:06 PM CST

## 2024-01-30 NOTE — TELEPHONE ENCOUNTER
Refill Decision Note   Jl Haynes  is requesting a refill authorization.  Brief Assessment and Rationale for Refill:  Quick Discontinue     Medication Therapy Plan:  Pt is now on 1000 mg    Medication Reconciliation Completed: No   Comments:     No Care Gaps recommended.     Note composed:2:29 PM 01/30/2024

## 2024-03-13 NOTE — PROGRESS NOTES
Pt's O2 sats dropped to 64% after administering her dilaudid, pt was responsive, awake, and oriented. Pt was placed on 2L of O2 via NC and her sats recovered to 98%. Dr. Chou notified.    Subjective:      Patient ID: Jl Haynes is a 41 y.o. male.    Chief Complaint:  Diabetes      History of Present Illness  With regards to the diabetes:    Diagnosed:  10/05/2021 with a hemoglobin A1c of 13.8.  Was known to have pre-dm since his late 30s     Known complications: none     Current regimen:  Metformin  500 bid (rx says 1000 mg bid but he is not doing that) - causes constipation        Failed treatments:  None        # times a day testing   Qod 113   Log reviewed:  none      Hypoglycemic event? None      Diabetes Management Status    Statin: Taking  ACE/ARB: Taking    Screening or Prevention Patient's value Goal Complete/Controlled?   HgA1C Testing and Control   Lab Results   Component Value Date    HGBA1C 6.5 (H) 02/03/2022      Annually/Less than 8% Yes   Lipid profile : 11/05/2021 Annually Yes   LDL control Lab Results   Component Value Date    LDLCALC 132.0 11/05/2021    Annually/Less than 100 mg/dl  No   Nephropathy screening No results found for: LABMICR  Lab Results   Component Value Date    PROTEINUA Negative 10/06/2021    Annually Yes   Blood pressure BP Readings from Last 1 Encounters:   02/07/22 116/82    Less than 140/90 Yes   Dilated retinal exam Most Recent Eye Exam Date: Not Found Annually Yes   Foot exam   : 11/05/2021 Annually Yes       Last eye exam: w/i the year. 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  Education - last visit:  None   Exercise - tries to stay active       Weight loss at presentation but has since stabilized and he has gained a few lbs     + Polyuria and nocturia   No  Polydipsia or vision changes       With regards to hypertension:  Tolerating  ARB    With regards to dyslipidemia:  Tolerating statin     Works offshore  ++ FH DM       ROS:   As above    Objective:     /82   Ht 6' (1.829 m)   Wt 106.9 kg (235 lb 12.5  oz)   BMI 31.98 kg/m²   BP Readings from Last 3 Encounters:   02/07/22 116/82   02/03/22 128/82   11/05/21 109/69     Wt Readings from Last 1 Encounters:   02/07/22 0804 106.9 kg (235 lb 12.5 oz)     Body mass index is 31.98 kg/m².      Physical Exam  Vitals reviewed.   Constitutional:       Appearance: Normal appearance.   Neck:      Comments: Normal thyroid  Cardiovascular:      Rate and Rhythm: Normal rate.      Pulses: Normal pulses.   Pulmonary:      Effort: Pulmonary effort is normal.   Abdominal:      Palpations: Abdomen is soft.   Musculoskeletal:      Right lower leg: No edema.      Left lower leg: No edema.       Protective Sensation (w/ 10 gram monofilament):  Right: Intact  Left: Intact    Visual Inspection:  Normal -  Bilateral    Pedal Pulses:   Right: Present  Left: Present    Posterior tibialis:   Right:Present  Left: Present             Lab Review:   Lab Results   Component Value Date    HGBA1C 6.5 (H) 02/03/2022     Lab Results   Component Value Date    CHOL 188 11/05/2021    HDL 36 (L) 11/05/2021    LDLCALC 132.0 11/05/2021    TRIG 100 11/05/2021    CHOLHDL 19.1 (L) 11/05/2021     Lab Results   Component Value Date     02/03/2022     02/03/2022    K 4.0 02/03/2022    K 4.0 02/03/2022     02/03/2022     02/03/2022    CO2 30 (H) 02/03/2022    CO2 30 (H) 02/03/2022    GLU 97 02/03/2022    GLU 97 02/03/2022    BUN 9 02/03/2022    BUN 9 02/03/2022    CREATININE 1.1 02/03/2022    CREATININE 1.1 02/03/2022    CALCIUM 10.2 02/03/2022    CALCIUM 10.2 02/03/2022    PROT 7.6 02/03/2022    PROT 7.6 02/03/2022    ALBUMIN 4.5 02/03/2022    ALBUMIN 4.5 02/03/2022    BILITOT 0.6 02/03/2022    BILITOT 0.6 02/03/2022    ALKPHOS 65 02/03/2022    ALKPHOS 65 02/03/2022    AST 16 02/03/2022    AST 16 02/03/2022    ALT 21 02/03/2022    ALT 21 02/03/2022    ANIONGAP 5 (L) 02/03/2022    ANIONGAP 5 (L) 02/03/2022    ESTGFRAFRICA >60 02/03/2022    ESTGFRAFRICA >60 02/03/2022    EGFRNONAA >60  02/03/2022    EGFRNONAA >60 02/03/2022    TSH 1.415 10/05/2021     Vit D, 25-Hydroxy   Date Value Ref Range Status   10/05/2021 27 (L) 30 - 96 ng/mL Final     Comment:     Vitamin D deficiency.........<10 ng/mL                              Vitamin D insufficiency......10-29 ng/mL       Vitamin D sufficiency........> or equal to 30 ng/mL  Vitamin D toxicity............>100 ng/mL         Assessment and Plan     Type 2 diabetes mellitus without complication, without long-term current use of insulin  Reviewed goals of therapy are to get the best control we can without hypoglycemia    Refer to education- for Trulicity start and to discuss making healthier choices with regards to diet      Reviewed that we would like to add a medication for diabetes that would help weight loss.  Was given the choices of incretin or GLP 1. He opted for a GLP 1     Medication changes:   Stop:  Decrease metformin to 1 pill once a day.  Start:  Trulicity after Education         -Advised occasional self blood glucose monitoring.  Patient encouraged to document glucose results and bring them to every clinic visit      -Hypoglycemia precautions discussed. Instructed on precautions before driving.      -Close adherence to lifestyle changes recommended.      -Periodic follow ups for eye evaluations, foot care and dental care suggested.    rtc in 3 months            Essential hypertension  On an ARB    Mixed hyperlipidemia  On a statin    BMI 31.0-31.9,adult  Reviewed that we would like to add a medication for diabetes that would help weight loss.  Was given the choices of incretin or GLP 1. He opted for a GLP 1    Unspecified vitamin D deficiency  Start over the counter vitamin D 2000 iu a day

## 2024-05-20 ENCOUNTER — LAB VISIT (OUTPATIENT)
Dept: LAB | Facility: HOSPITAL | Age: 44
End: 2024-05-20
Attending: FAMILY MEDICINE
Payer: COMMERCIAL

## 2024-05-20 ENCOUNTER — OFFICE VISIT (OUTPATIENT)
Dept: FAMILY MEDICINE | Facility: CLINIC | Age: 44
End: 2024-05-20
Payer: COMMERCIAL

## 2024-05-20 VITALS
DIASTOLIC BLOOD PRESSURE: 80 MMHG | HEIGHT: 72 IN | WEIGHT: 243.19 LBS | BODY MASS INDEX: 32.94 KG/M2 | OXYGEN SATURATION: 98 % | HEART RATE: 78 BPM | SYSTOLIC BLOOD PRESSURE: 122 MMHG

## 2024-05-20 DIAGNOSIS — K42.9 RECURRENT UMBILICAL HERNIA: ICD-10-CM

## 2024-05-20 DIAGNOSIS — E11.9 TYPE 2 DIABETES MELLITUS WITHOUT COMPLICATION, WITHOUT LONG-TERM CURRENT USE OF INSULIN: ICD-10-CM

## 2024-05-20 DIAGNOSIS — E78.2 MIXED HYPERLIPIDEMIA: ICD-10-CM

## 2024-05-20 DIAGNOSIS — Z76.89 ENCOUNTER TO ESTABLISH CARE WITH NEW DOCTOR: Primary | ICD-10-CM

## 2024-05-20 DIAGNOSIS — I10 ESSENTIAL HYPERTENSION: ICD-10-CM

## 2024-05-20 LAB
ALBUMIN SERPL BCP-MCNC: 4.5 G/DL (ref 3.5–5.2)
ALP SERPL-CCNC: 71 U/L (ref 55–135)
ALT SERPL W/O P-5'-P-CCNC: 66 U/L (ref 10–44)
ANION GAP SERPL CALC-SCNC: 10 MMOL/L (ref 8–16)
AST SERPL-CCNC: 30 U/L (ref 10–40)
BASOPHILS # BLD AUTO: 0.03 K/UL (ref 0–0.2)
BASOPHILS NFR BLD: 0.5 % (ref 0–1.9)
BILIRUB SERPL-MCNC: 0.6 MG/DL (ref 0.1–1)
BUN SERPL-MCNC: 12 MG/DL (ref 6–20)
CALCIUM SERPL-MCNC: 9.8 MG/DL (ref 8.7–10.5)
CHLORIDE SERPL-SCNC: 107 MMOL/L (ref 95–110)
CHOLEST SERPL-MCNC: 184 MG/DL (ref 120–199)
CHOLEST/HDLC SERPL: 5.4 {RATIO} (ref 2–5)
CO2 SERPL-SCNC: 24 MMOL/L (ref 23–29)
CREAT SERPL-MCNC: 1.1 MG/DL (ref 0.5–1.4)
DIFFERENTIAL METHOD BLD: ABNORMAL
EOSINOPHIL # BLD AUTO: 0.1 K/UL (ref 0–0.5)
EOSINOPHIL NFR BLD: 1.7 % (ref 0–8)
ERYTHROCYTE [DISTWIDTH] IN BLOOD BY AUTOMATED COUNT: 14 % (ref 11.5–14.5)
EST. GFR  (NO RACE VARIABLE): >60 ML/MIN/1.73 M^2
ESTIMATED AVG GLUCOSE: 120 MG/DL (ref 68–131)
GLUCOSE SERPL-MCNC: 99 MG/DL (ref 70–110)
HBA1C MFR BLD: 5.8 % (ref 4–5.6)
HCT VFR BLD AUTO: 42.6 % (ref 40–54)
HDLC SERPL-MCNC: 34 MG/DL (ref 40–75)
HDLC SERPL: 18.5 % (ref 20–50)
HGB BLD-MCNC: 14.9 G/DL (ref 14–18)
IMM GRANULOCYTES # BLD AUTO: 0.01 K/UL (ref 0–0.04)
IMM GRANULOCYTES NFR BLD AUTO: 0.2 % (ref 0–0.5)
LDLC SERPL CALC-MCNC: 96.2 MG/DL (ref 63–159)
LYMPHOCYTES # BLD AUTO: 2.5 K/UL (ref 1–4.8)
LYMPHOCYTES NFR BLD: 38.6 % (ref 18–48)
MCH RBC QN AUTO: 29.9 PG (ref 27–31)
MCHC RBC AUTO-ENTMCNC: 35 G/DL (ref 32–36)
MCV RBC AUTO: 85 FL (ref 82–98)
MONOCYTES # BLD AUTO: 0.5 K/UL (ref 0.3–1)
MONOCYTES NFR BLD: 8.2 % (ref 4–15)
NEUTROPHILS # BLD AUTO: 3.2 K/UL (ref 1.8–7.7)
NEUTROPHILS NFR BLD: 50.8 % (ref 38–73)
NONHDLC SERPL-MCNC: 150 MG/DL
NRBC BLD-RTO: 0 /100 WBC
PLATELET # BLD AUTO: 237 K/UL (ref 150–450)
PMV BLD AUTO: 9.1 FL (ref 9.2–12.9)
POTASSIUM SERPL-SCNC: 4 MMOL/L (ref 3.5–5.1)
PROT SERPL-MCNC: 7.7 G/DL (ref 6–8.4)
RBC # BLD AUTO: 4.99 M/UL (ref 4.6–6.2)
SODIUM SERPL-SCNC: 141 MMOL/L (ref 136–145)
TRIGL SERPL-MCNC: 269 MG/DL (ref 30–150)
WBC # BLD AUTO: 6.34 K/UL (ref 3.9–12.7)

## 2024-05-20 PROCEDURE — 80061 LIPID PANEL: CPT | Performed by: FAMILY MEDICINE

## 2024-05-20 PROCEDURE — 80053 COMPREHEN METABOLIC PANEL: CPT | Performed by: FAMILY MEDICINE

## 2024-05-20 PROCEDURE — 99999 PR PBB SHADOW E&M-EST. PATIENT-LVL IV: CPT | Mod: PBBFAC,,, | Performed by: FAMILY MEDICINE

## 2024-05-20 PROCEDURE — 85025 COMPLETE CBC W/AUTO DIFF WBC: CPT | Performed by: FAMILY MEDICINE

## 2024-05-20 PROCEDURE — 36415 COLL VENOUS BLD VENIPUNCTURE: CPT | Performed by: FAMILY MEDICINE

## 2024-05-20 PROCEDURE — 99214 OFFICE O/P EST MOD 30 MIN: CPT | Mod: S$GLB,,, | Performed by: FAMILY MEDICINE

## 2024-05-20 PROCEDURE — 83036 HEMOGLOBIN GLYCOSYLATED A1C: CPT | Performed by: FAMILY MEDICINE

## 2024-05-20 RX ORDER — PRAVASTATIN SODIUM 40 MG/1
40 TABLET ORAL DAILY
Qty: 90 TABLET | Refills: 3 | Status: SHIPPED | OUTPATIENT
Start: 2024-05-20

## 2024-05-20 NOTE — PROGRESS NOTES
(Portions of this note were dictated using voice recognition software and may contain dictation related errors in spelling/grammar/syntax not found on text review)    CC:   Chief Complaint   Patient presents with    [facility]     Pt is fasting    Umbilical Hernia     Pt stating that 10 years ago he had umbilical hernia surgery, but recently he started to have pain again       HPI: 43 y.o. male presented to [facility] as a new patient.  He has medical history significant for essential hypertension, hyperlipidemia, type 2 diabetes mellitus, allergies, depression.      DM:  He takes metformin 1000 mg twice a day along with glimepiride 1 mg, reports adherence with medications, does not monitor blood sugar at home, last A1c in chart was 7.8 on November 23    HTN:  He takes losartan 100 mg daily, reports compliance with the medication, no side effects, blood pressure has been stable    HLD: he has been out of pravastatin for past few months, needs refills    Patient reports having umbilical hernia surgery 10 years ago, he states he feels pain in a bulge on the side of the umbilicus with lifting heavy weight for the past 2 months.  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    He does not smoke, has no toxic habits.    He is physically for work, does not do formal exercise.      He denies having any other symptoms or concerns.    Past Medical History:   Diagnosis Date    Allergy     Depression     Enlarged prostate     Essential hypertension 11/05/2021    HTN (hypertension)     Hyperlipidemia     Type 2 diabetes mellitus with hyperosmolarity without coma, without long-term current use of insulin 11/05/2021       Past Surgical History:   Procedure Laterality Date    UMBILICAL HERNIA REPAIR         Family History   Problem Relation Name Age of Onset    Diabetes Mother      Hypertension Mother      Hypertension Father      Diabetes Mellitus Maternal Grandmother       Dementia Maternal Grandmother      Heart attack Maternal Grandfather      Diabetes Mellitus Maternal Uncle      Prostate cancer Neg Hx      Kidney disease Neg Hx         Social History     Tobacco Use    Smoking status: Former     Current packs/day: 0.00     Types: Cigarettes     Quit date:      Years since quittin.3    Smokeless tobacco: Never   Substance Use Topics    Alcohol use: Yes     Alcohol/week: 1.0 standard drink of alcohol     Types: 1 Standard drinks or equivalent per week     Comment: Rare social occasions.    Drug use: Not Currently     Types: Marijuana       Lab Results   Component Value Date    WBC 7.45 2023    HGB 15.0 2023    HCT 43.1 2023    MCV 86 2023     2023    CHOL 186 2023    CHOL 186 2023    TRIG 184 (H) 2023    TRIG 184 (H) 2023    HDL 36 (L) 2023    HDL 36 (L) 2023    ALT 73 (H) 2023    AST 40 2023    BILITOT 0.9 2023    ALKPHOS 71 2023     2023    K 4.1 2023     2023    CREATININE 1.2 2023    ESTGFRAFRICA >60 2022    ESTGFRAFRICA >60 2022    EGFRNONAA >60 2022    EGFRNONAA >60 2022    CALCIUM 10.1 2023    ALBUMIN 4.6 2023    BUN 7 2023    CO2 26 2023    TSH 1.254 2023    PSA 0.84 10/05/2021    HGBA1C 7.8 (H) 2023    MICALBCREAT 7.0 2023    LDLCALC 113.2 2023    LDLCALC 113.2 2023     (H) 2023    [id number] 27 (L) 10/05/2021             Vital signs reviewed  PE:   APPEARANCE: Well nourished, well developed, in no acute distress.    HEAD: Normocephalic, atraumatic.  EYES: EOMI.  Conjunctivae noninjected.  NOSE: Mucosa pink. Airway clear.  NECK: Supple with no cervical lymphadenopathy.    CHEST: Good inspiratory effort. Lungs clear to auscultation with no wheezes or crackles.  CARDIOVASCULAR: Normal S1, S2. No rubs, murmurs, or gallops.  ABDOMEN: Bowel sounds  normal. Not distended. Soft. Slight tenderness in left paraumbilical region, No organomegaly.cough reflex positive  EXTREMITIES: No edema, cyanosis, or clubbing.    Review of Systems   Constitutional:  Negative for chills, fatigue and fever.   HENT: Negative.     Respiratory:  Negative for cough, shortness of breath and wheezing.    Cardiovascular:  Negative for chest pain, palpitations and leg swelling.   Gastrointestinal: Negative.    Genitourinary: Negative.    Musculoskeletal: Negative.    Neurological: Negative.    Psychiatric/Behavioral: Negative.     All other systems reviewed and are negative.      IMPRESSION  1. Encounter to establish care with new doctor    2. Mixed hyperlipidemia    3. Essential hypertension    4. Recurrent umbilical hernia    5. Type 2 diabetes mellitus without complication, without long-term current use of insulin    6. BMI 31.0-31.9,adult            PLAN      1. Mixed hyperlipidemia    - Lipid Panel; Future    - pravastatin (PRAVACHOL) 40 MG tablet; Take 1 tablet (40 mg total) by mouth once daily.  Dispense: 90 tablet; Refill: 3      2. Essential hypertension    - Comprehensive Metabolic Panel; Future  - CBC Auto Differential; Future    Stable    Continue current medication      3. Recurrent umbilical hernia    - Ambulatory referral/consult to General Surgery; Future      4. Type 2 diabetes mellitus without complication, without long-term current use of insulin    - Hemoglobin A1C; Future    Uncontrolled    Will update A1c and based on it will make changes to medicine    Had eye exam dozen recently, will drop records      5. BMI 31.0-31.9,adult      Counseling provided on healthy lifestyle, encouraged to do moderate intensity regular exercise 30 minutes every day 5 days a week, to include vegetables and fruits and cut back on saturated fats and carbohydrates.       6. Encounter to establish care with new doctor         SCREENINGS      Immunizations:     UTD with Tdap    Had 4 doses of  Covid vaccines        Age/demographic appropriate health maintenance:    Health Maintenance Due   Topic Date Due    Foot Exam  02/07/2023    Eye Exam  06/28/2023    Hemoglobin A1c  05/22/2024           Spent adequate time in obtaining history and explaining differentials     30 minutes spent during this visit of which greater than 50% devoted to face-face counseling and coordination of care regarding diagnosis and management plan     F/U 3 months      Mateo Wright   5/20/2024

## 2024-07-22 ENCOUNTER — OFFICE VISIT (OUTPATIENT)
Dept: SURGERY | Facility: CLINIC | Age: 44
End: 2024-07-22
Payer: COMMERCIAL

## 2024-07-22 VITALS
TEMPERATURE: 98 F | DIASTOLIC BLOOD PRESSURE: 85 MMHG | HEIGHT: 72 IN | SYSTOLIC BLOOD PRESSURE: 125 MMHG | HEART RATE: 64 BPM | BODY MASS INDEX: 33.82 KG/M2 | WEIGHT: 249.69 LBS

## 2024-07-22 DIAGNOSIS — K43.2 RECURRENT VENTRAL HERNIA: Primary | ICD-10-CM

## 2024-07-22 DIAGNOSIS — K42.9 RECURRENT UMBILICAL HERNIA: ICD-10-CM

## 2024-07-22 PROCEDURE — 99999 PR PBB SHADOW E&M-EST. PATIENT-LVL IV: CPT | Mod: PBBFAC,,, | Performed by: STUDENT IN AN ORGANIZED HEALTH CARE EDUCATION/TRAINING PROGRAM

## 2024-07-22 PROCEDURE — 99204 OFFICE O/P NEW MOD 45 MIN: CPT | Mod: S$GLB,,, | Performed by: STUDENT IN AN ORGANIZED HEALTH CARE EDUCATION/TRAINING PROGRAM

## 2024-07-22 RX ORDER — ACETAMINOPHEN 500 MG
500 TABLET ORAL EVERY 6 HOURS PRN
COMMUNITY

## 2024-07-24 ENCOUNTER — HOSPITAL ENCOUNTER (OUTPATIENT)
Dept: RADIOLOGY | Facility: HOSPITAL | Age: 44
Discharge: HOME | End: 2024-07-24
Attending: STUDENT IN AN ORGANIZED HEALTH CARE EDUCATION/TRAINING PROGRAM
Payer: COMMERCIAL

## 2024-07-24 DIAGNOSIS — K43.2 RECURRENT VENTRAL HERNIA: ICD-10-CM

## 2024-07-24 PROCEDURE — 74176 CT ABD & PELVIS W/O CONTRAST: CPT | Mod: TC

## 2024-07-24 PROCEDURE — 74176 CT ABD & PELVIS W/O CONTRAST: CPT | Mod: 26,,, | Performed by: STUDENT IN AN ORGANIZED HEALTH CARE EDUCATION/TRAINING PROGRAM

## 2024-07-25 NOTE — PROGRESS NOTES
Patient ID: Jl Haynes is a 43 y.o. male.    Chief Complaint: No chief complaint on file.      HPI:  HPI  43M with umbilical bulge for about 4 months.  Some pain with lifting. Overall mild. Works offshore, has to lift a lot.   Hx of UHR with prolene mesh in 2010.       Review of Systems   Constitutional:  Negative for chills, diaphoresis and fever.   HENT:  Negative for trouble swallowing.    Respiratory:  Negative for cough, shortness of breath, wheezing and stridor.    Cardiovascular:  Negative for chest pain and palpitations.   Gastrointestinal:  Negative for abdominal distention, abdominal pain, blood in stool, diarrhea, nausea and vomiting.   Endocrine: Negative for cold intolerance and heat intolerance.   Genitourinary:  Negative for difficulty urinating.   Musculoskeletal:  Negative for back pain.   Skin:  Negative for rash.   Allergic/Immunologic: Negative for immunocompromised state.   Neurological:  Negative for dizziness, syncope and numbness.   Hematological:  Negative for adenopathy.   Psychiatric/Behavioral:  Negative for agitation.        Current Outpatient Medications   Medication Sig Dispense Refill    acetaminophen (TYLENOL) 500 MG tablet Take 500 mg by mouth every 6 (six) hours as needed for Pain.      ciclopirox (PENLAC) 8 % Soln Apply topically nightly. 6 mL 6    clotrimazole-betamethasone 1-0.05% (LOTRISONE) cream Apply topically 2 (two) times daily. 45 g 5    glimepiride (AMARYL) 1 MG tablet Take 1 tablet (1 mg total) by mouth before breakfast. 90 tablet 3    losartan (COZAAR) 100 MG tablet Take 1 tablet (100 mg total) by mouth once daily. 90 tablet 3    metFORMIN (GLUCOPHAGE) 1000 MG tablet Take 1 tablet (1,000 mg total) by mouth 2 (two) times daily with meals. 180 tablet 3    pravastatin (PRAVACHOL) 40 MG tablet Take 1 tablet (40 mg total) by mouth once daily. 90 tablet 3    sildenafil (REVATIO) 20 mg Tab Take 3, 4, or 5 tablets at a time (as directed by provider) 1 hour before sexual  activity (Patient not taking: Reported on 2024) 50 tablet 11     No current facility-administered medications for this visit.       Review of patient's allergies indicates:   Allergen Reactions    Penicillins Anaphylaxis       Past Medical History:   Diagnosis Date    Allergy     Depression     Enlarged prostate     Essential hypertension 2021    HTN (hypertension)     Hyperlipidemia     Type 2 diabetes mellitus with hyperosmolarity without coma, without long-term current use of insulin 2021       Past Surgical History:   Procedure Laterality Date    UMBILICAL HERNIA REPAIR         Social History     Socioeconomic History    Marital status:    Tobacco Use    Smoking status: Former     Current packs/day: 0.00     Types: Cigarettes     Quit date:      Years since quittin.5    Smokeless tobacco: Never   Substance and Sexual Activity    Alcohol use: Yes     Alcohol/week: 1.0 standard drink of alcohol     Types: 1 Standard drinks or equivalent per week     Comment: Rare social occasions.    Drug use: Not Currently     Types: Marijuana    Sexual activity: Not Currently       Vitals:    24 0811   BP: 125/85   Pulse: 64   Temp: 97.8 °F (36.6 °C)       Physical Exam  Constitutional:       General: He is not in acute distress.  HENT:      Head: Normocephalic and atraumatic.   Eyes:      General: No scleral icterus.  Cardiovascular:      Rate and Rhythm: Normal rate.   Pulmonary:      Effort: Pulmonary effort is normal. No respiratory distress.      Breath sounds: No stridor.   Abdominal:      Palpations: Abdomen is soft.      Tenderness: There is no abdominal tenderness.      Hernia: A hernia is present.       Lymphadenopathy:      Cervical: No cervical adenopathy.   Skin:     General: Skin is warm.      Findings: No erythema.   Neurological:      Mental Status: He is alert and oriented to person, place, and time.   Psychiatric:         Behavior: Behavior normal.     Body mass index is  33.86 kg/m².  Hga1c 5.8    Assessment & Plan:  43M with recurrent UH  Will check CT  Virtual follow up

## 2024-08-07 ENCOUNTER — OFFICE VISIT (OUTPATIENT)
Dept: SURGERY | Facility: CLINIC | Age: 44
End: 2024-08-07
Payer: COMMERCIAL

## 2024-08-07 DIAGNOSIS — K43.2 RECURRENT VENTRAL HERNIA: Primary | ICD-10-CM

## 2024-08-07 PROCEDURE — 99213 OFFICE O/P EST LOW 20 MIN: CPT | Mod: 95,,, | Performed by: STUDENT IN AN ORGANIZED HEALTH CARE EDUCATION/TRAINING PROGRAM

## 2024-08-08 ENCOUNTER — ANESTHESIA EVENT (OUTPATIENT)
Dept: SURGERY | Facility: HOSPITAL | Age: 44
End: 2024-08-08
Payer: COMMERCIAL

## 2024-08-21 ENCOUNTER — CLINICAL SUPPORT (OUTPATIENT)
Dept: LAB | Facility: HOSPITAL | Age: 44
End: 2024-08-21
Attending: STUDENT IN AN ORGANIZED HEALTH CARE EDUCATION/TRAINING PROGRAM
Payer: COMMERCIAL

## 2024-08-21 DIAGNOSIS — I10 ESSENTIAL HYPERTENSION: ICD-10-CM

## 2024-08-21 DIAGNOSIS — Z01.818 PREOP TESTING: ICD-10-CM

## 2024-08-21 LAB
OHS QRS DURATION: 78 MS
OHS QTC CALCULATION: 381 MS

## 2024-08-21 PROCEDURE — 93010 ELECTROCARDIOGRAM REPORT: CPT | Mod: ,,, | Performed by: INTERNAL MEDICINE

## 2024-08-21 PROCEDURE — 93005 ELECTROCARDIOGRAM TRACING: CPT

## 2024-09-03 ENCOUNTER — PATIENT MESSAGE (OUTPATIENT)
Dept: PREADMISSION TESTING | Facility: HOSPITAL | Age: 44
End: 2024-09-03

## 2024-09-03 ENCOUNTER — HOSPITAL ENCOUNTER (OUTPATIENT)
Dept: PREADMISSION TESTING | Facility: HOSPITAL | Age: 44
Discharge: HOME | End: 2024-09-03
Attending: NURSE PRACTITIONER
Payer: COMMERCIAL

## 2024-09-03 NOTE — PRE-PROCEDURE INSTRUCTIONS
Son.    Allergies, medical, surgical, family and psychosocial histories reviewed with patient. Periop plan of care reviewed. Preop instructions given, including medications to take and to hold. Hibiclens soap and instructions on use given. Time allotted for questions to be addressed.        Arrival time 0530.      Instructed to  take Losartan the morning of surgery.     Surgery instructions reviewed and surgery booklet sent or emailed to the patient via the portal.

## 2024-09-03 NOTE — ANESTHESIA PREPROCEDURE EVALUATION
[facility]  Anesthesia Pre-Operative Evaluation         Patient Name: Jl Haynes  YOB: 1980  MRN: [id number]    SUBJECTIVE:     Pre-operative evaluation for Procedure(s) (LRB):  ROBOTIC REPAIR, HERNIA, VENTRAL (N/A)     09/16/2024    Jl Haynes is a 43 y.o. male w/ a significant PMHx of NIDDM, HTN, and HLD.    Patient now presents for the above procedure(s).    No echo results found for this or any previous visit.       LDA: None documented.       Prev airway: None documented.    Drips: None documented.      Patient Active Problem List   Diagnosis    Pulmonary nodule, right    Unspecified vitamin D deficiency    Depression    Mixed hyperlipidemia    Type 2 diabetes mellitus without complication, without long-term current use of insulin    Essential hypertension    BMI 31.0-31.9,adult    H/O balanitis       Review of patient's allergies indicates:   Allergen Reactions    Penicillins Anaphylaxis       Current Inpatient Medications:      No current facility-administered medications on file prior to encounter.     Current Outpatient Medications on File Prior to Encounter   Medication Sig Dispense Refill    acetaminophen (TYLENOL) 500 MG tablet Take 500 mg by mouth every 6 (six) hours as needed for Pain.      ciclopirox (PENLAC) 8 % Soln Apply topically nightly. 6 mL 6    clotrimazole-betamethasone 1-0.05% (LOTRISONE) cream Apply topically 2 (two) times daily. 45 g 5    glimepiride (AMARYL) 1 MG tablet Take 1 tablet (1 mg total) by mouth before breakfast. 90 tablet 3    losartan (COZAAR) 100 MG tablet Take 1 tablet (100 mg total) by mouth once daily. 90 tablet 3    metFORMIN (GLUCOPHAGE) 1000 MG tablet Take 1 tablet (1,000 mg total) by mouth 2 (two) times daily with meals. 180 tablet 3    pravastatin (PRAVACHOL) 40 MG tablet Take 1 tablet (40 mg total) by mouth once daily. 90 tablet 3    sildenafil (REVATIO) 20 mg Tab Take 3, 4, or 5 tablets at a time (as directed by provider) 1 hour  before sexual activity (Patient not taking: Reported on 2024) 50 tablet 11       Past Surgical History:   Procedure Laterality Date    UMBILICAL HERNIA REPAIR         Social History     Socioeconomic History    Marital status:    Tobacco Use    Smoking status: Former     Current packs/day: 0.00     Types: Cigarettes     Quit date:      Years since quittin.7    Smokeless tobacco: Never   Substance and Sexual Activity    Alcohol use: Yes     Alcohol/week: 1.0 standard drink of alcohol     Types: 1 Standard drinks or equivalent per week     Comment: Rare social occasions.    Drug use: Not Currently     Types: Marijuana    Sexual activity: Not Currently       OBJECTIVE:     Vital Signs Range (Last 24H):  BP: ()/()   Arterial Line BP: ()/()       Significant Labs:  Lab Results   Component Value Date    WBC 6.34 2024    HGB 14.9 2024    HCT 42.6 2024     2024    CHOL 163 2024    TRIG 372 (H) 2024    HDL 31 (L) 2024    ALT 50 (H) 2024    AST 23 2024     2024    K 4.4 2024     2024    CREATININE 1.1 2024    BUN 10 2024    CO2 24 2024    TSH 1.254 2023    PSA 0.84 10/05/2021    HGBA1C 6.8 (H) 2024       Diagnostic Studies: No relevant studies.    EKG:   Results for orders placed or performed in visit on 24   EKG 12-lead    Collection Time: 24  7:42 AM   Result Value Ref Range    QRS Duration 78 ms    [facility] QTC Calculation 381 ms    Narrative    Test Reason : PRE-OP    Vent. Rate : 066 BPM     Atrial Rate : 066 BPM     P-R Int : 172 ms          QRS Dur : 078 ms      QT Int : 364 ms       P-R-T Axes : -07 021 013 degrees     QTc Int : 381 ms    Normal sinus rhythm  Normal ECG  When compared with ECG of 28-MAY-2018 11:49,  No significant change was found  Confirmed by Constantin Oneill MD ([id number]) on 2024 4:15:18 PM    Referred By: BETH MILLER           Confirmed By:Constantin  DONTE Oneill MD       2D ECHO:  TTE:  No results found for this or any previous visit.    BISI:  No results found for this or any previous visit.    ASSESSMENT/PLAN:           Pre-op Assessment    I have reviewed the Patient Summary Reports.     I have reviewed the Nursing Notes.    I have reviewed the Medications.     Review of Systems  Anesthesia Hx:  No problems with previous Anesthesia               Denies Personal Hx of Anesthesia complications.                    Social:  Former Smoker, Social Alcohol Use       Cardiovascular:  Exercise tolerance: good   Hypertension       Denies Angina.     hyperlipidemia                             Pulmonary:  Pulmonary Normal      Denies Shortness of breath.                  Neurological:  Neurology Normal Denies TIA.  Denies CVA.    Denies Seizures.                                Endocrine:  Diabetes (a1c 6.8 on 8/21/24), type 2           Psych:    depression                Physical Exam  General: Well nourished, Alert, Cooperative and Oriented    Airway:  Mallampati: II / II  Mouth Opening: Normal  TM Distance: Normal  Tongue: Normal  Neck ROM: Normal ROM    Dental:  Intact    Chest/Lungs:  Clear to auscultation, Normal Respiratory Rate    Heart:  Rate: Normal  Rhythm: Regular Rhythm              Anesthesia Plan  Type of Anesthesia, risks & benefits discussed:    Anesthesia Type: Gen ETT  Intra-op Monitoring Plan: Standard ASA Monitors  Post Op Pain Control Plan: multimodal analgesia  Induction:  IV  Airway Plan: Direct, Post-Induction  Informed Consent: Informed consent signed with the Patient and all parties understand the risks and agree with anesthesia plan.  All questions answered.   ASA Score: 2  Day of Surgery Review of History & Physical: H&P Update referred to the surgeon/provider.    Ready For Surgery From Anesthesia Perspective.     .

## 2024-09-03 NOTE — DISCHARGE INSTRUCTIONS

## 2024-09-17 ENCOUNTER — ANESTHESIA (OUTPATIENT)
Dept: SURGERY | Facility: HOSPITAL | Age: 44
End: 2024-09-17
Payer: COMMERCIAL

## 2024-09-17 ENCOUNTER — HOSPITAL ENCOUNTER (OUTPATIENT)
Facility: HOSPITAL | Age: 44
Discharge: HOME | End: 2024-09-17
Attending: STUDENT IN AN ORGANIZED HEALTH CARE EDUCATION/TRAINING PROGRAM | Admitting: STUDENT IN AN ORGANIZED HEALTH CARE EDUCATION/TRAINING PROGRAM
Payer: COMMERCIAL

## 2024-09-17 VITALS
DIASTOLIC BLOOD PRESSURE: 87 MMHG | WEIGHT: 245 LBS | RESPIRATION RATE: 20 BRPM | BODY MASS INDEX: 34.3 KG/M2 | HEIGHT: 71 IN | SYSTOLIC BLOOD PRESSURE: 127 MMHG | HEART RATE: 89 BPM | OXYGEN SATURATION: 100 % | TEMPERATURE: 98 F

## 2024-09-17 DIAGNOSIS — K43.2 RECURRENT VENTRAL HERNIA: Primary | ICD-10-CM

## 2024-09-17 DIAGNOSIS — I10 ESSENTIAL HYPERTENSION: ICD-10-CM

## 2024-09-17 DIAGNOSIS — Z01.818 PREOP TESTING: ICD-10-CM

## 2024-09-17 LAB — POCT GLUCOSE: 125 MG/DL (ref 70–110)

## 2024-09-17 PROCEDURE — 71000039 HC RECOVERY, EACH ADD'L HOUR: Performed by: STUDENT IN AN ORGANIZED HEALTH CARE EDUCATION/TRAINING PROGRAM

## 2024-09-17 PROCEDURE — 27201423 OPTIME MED/SURG SUP & DEVICES STERILE SUPPLY: Performed by: STUDENT IN AN ORGANIZED HEALTH CARE EDUCATION/TRAINING PROGRAM

## 2024-09-17 PROCEDURE — 63600175 PHARM REV CODE 636 W HCPCS

## 2024-09-17 PROCEDURE — 25000003 PHARM REV CODE 250

## 2024-09-17 PROCEDURE — 36000710: Performed by: STUDENT IN AN ORGANIZED HEALTH CARE EDUCATION/TRAINING PROGRAM

## 2024-09-17 PROCEDURE — C9290 INJ, BUPIVACAINE LIPOSOME: HCPCS | Performed by: STUDENT IN AN ORGANIZED HEALTH CARE EDUCATION/TRAINING PROGRAM

## 2024-09-17 PROCEDURE — 63600175 PHARM REV CODE 636 W HCPCS: Mod: JG | Performed by: STUDENT IN AN ORGANIZED HEALTH CARE EDUCATION/TRAINING PROGRAM

## 2024-09-17 PROCEDURE — 63600175 PHARM REV CODE 636 W HCPCS: Performed by: STUDENT IN AN ORGANIZED HEALTH CARE EDUCATION/TRAINING PROGRAM

## 2024-09-17 PROCEDURE — 71000015 HC POSTOP RECOV 1ST HR: Performed by: STUDENT IN AN ORGANIZED HEALTH CARE EDUCATION/TRAINING PROGRAM

## 2024-09-17 PROCEDURE — 37000008 HC ANESTHESIA 1ST 15 MINUTES: Performed by: STUDENT IN AN ORGANIZED HEALTH CARE EDUCATION/TRAINING PROGRAM

## 2024-09-17 PROCEDURE — 37000009 HC ANESTHESIA EA ADD 15 MINS: Performed by: STUDENT IN AN ORGANIZED HEALTH CARE EDUCATION/TRAINING PROGRAM

## 2024-09-17 PROCEDURE — 71000016 HC POSTOP RECOV ADDL HR: Performed by: STUDENT IN AN ORGANIZED HEALTH CARE EDUCATION/TRAINING PROGRAM

## 2024-09-17 PROCEDURE — 49615 RPR AA HRN RCR 3-10 RDC: CPT | Mod: ,,, | Performed by: STUDENT IN AN ORGANIZED HEALTH CARE EDUCATION/TRAINING PROGRAM

## 2024-09-17 PROCEDURE — 71000033 HC RECOVERY, INTIAL HOUR: Performed by: STUDENT IN AN ORGANIZED HEALTH CARE EDUCATION/TRAINING PROGRAM

## 2024-09-17 PROCEDURE — C1781 MESH (IMPLANTABLE): HCPCS | Performed by: STUDENT IN AN ORGANIZED HEALTH CARE EDUCATION/TRAINING PROGRAM

## 2024-09-17 PROCEDURE — 36000711: Performed by: STUDENT IN AN ORGANIZED HEALTH CARE EDUCATION/TRAINING PROGRAM

## 2024-09-17 DEVICE — MESH SYMBOTEX HERN RND 9CM: Type: IMPLANTABLE DEVICE | Site: ABDOMEN | Status: FUNCTIONAL

## 2024-09-17 RX ORDER — DEXAMETHASONE SODIUM PHOSPHATE 4 MG/ML
INJECTION, SOLUTION INTRA-ARTICULAR; INTRALESIONAL; INTRAMUSCULAR; INTRAVENOUS; SOFT TISSUE
Status: DISCONTINUED | OUTPATIENT
Start: 2024-09-17 | End: 2024-09-17

## 2024-09-17 RX ORDER — LIDOCAINE HYDROCHLORIDE 10 MG/ML
1 INJECTION, SOLUTION EPIDURAL; INFILTRATION; INTRACAUDAL; PERINEURAL ONCE
Status: DISCONTINUED | OUTPATIENT
Start: 2024-09-17 | End: 2024-09-17 | Stop reason: HOSPADM

## 2024-09-17 RX ORDER — DEXMEDETOMIDINE HYDROCHLORIDE 100 UG/ML
INJECTION, SOLUTION INTRAVENOUS
Status: DISCONTINUED | OUTPATIENT
Start: 2024-09-17 | End: 2024-09-17

## 2024-09-17 RX ORDER — HYDROMORPHONE HYDROCHLORIDE 2 MG/ML
0.2 INJECTION, SOLUTION INTRAMUSCULAR; INTRAVENOUS; SUBCUTANEOUS EVERY 5 MIN PRN
Status: DISCONTINUED | OUTPATIENT
Start: 2024-09-17 | End: 2024-09-17 | Stop reason: HOSPADM

## 2024-09-17 RX ORDER — HYDROCODONE BITARTRATE AND ACETAMINOPHEN 5; 325 MG/1; MG/1
1 TABLET ORAL EVERY 4 HOURS PRN
Qty: 10 TABLET | Refills: 0 | Status: SHIPPED | OUTPATIENT
Start: 2024-09-17

## 2024-09-17 RX ORDER — AMOXICILLIN 250 MG
1 CAPSULE ORAL 2 TIMES DAILY
COMMUNITY
Start: 2024-09-17

## 2024-09-17 RX ORDER — EPHEDRINE SULFATE 50 MG/ML
INJECTION, SOLUTION INTRAVENOUS
Status: DISCONTINUED | OUTPATIENT
Start: 2024-09-17 | End: 2024-09-17

## 2024-09-17 RX ORDER — ONDANSETRON HYDROCHLORIDE 2 MG/ML
4 INJECTION, SOLUTION INTRAVENOUS DAILY PRN
Status: DISCONTINUED | OUTPATIENT
Start: 2024-09-17 | End: 2024-09-17 | Stop reason: HOSPADM

## 2024-09-17 RX ORDER — LIDOCAINE HYDROCHLORIDE 20 MG/ML
INJECTION, SOLUTION EPIDURAL; INFILTRATION; INTRACAUDAL; PERINEURAL
Status: DISCONTINUED | OUTPATIENT
Start: 2024-09-17 | End: 2024-09-17

## 2024-09-17 RX ORDER — MIDAZOLAM HYDROCHLORIDE 1 MG/ML
INJECTION INTRAMUSCULAR; INTRAVENOUS
Status: DISCONTINUED | OUTPATIENT
Start: 2024-09-17 | End: 2024-09-17

## 2024-09-17 RX ORDER — BUPIVACAINE HYDROCHLORIDE 5 MG/ML
INJECTION, SOLUTION PERINEURAL
Status: DISCONTINUED | OUTPATIENT
Start: 2024-09-17 | End: 2024-09-17 | Stop reason: HOSPADM

## 2024-09-17 RX ORDER — OXYCODONE HYDROCHLORIDE 5 MG/1
5 TABLET ORAL
Status: DISCONTINUED | OUTPATIENT
Start: 2024-09-17 | End: 2024-09-17 | Stop reason: HOSPADM

## 2024-09-17 RX ORDER — CEFAZOLIN SODIUM 1 G/3ML
INJECTION, POWDER, FOR SOLUTION INTRAMUSCULAR; INTRAVENOUS
Status: DISCONTINUED | OUTPATIENT
Start: 2024-09-17 | End: 2024-09-17

## 2024-09-17 RX ORDER — SODIUM CHLORIDE 0.9 % (FLUSH) 0.9 %
10 SYRINGE (ML) INJECTION
Status: DISCONTINUED | OUTPATIENT
Start: 2024-09-17 | End: 2024-09-17 | Stop reason: HOSPADM

## 2024-09-17 RX ORDER — FENTANYL CITRATE 50 UG/ML
INJECTION, SOLUTION INTRAMUSCULAR; INTRAVENOUS
Status: DISCONTINUED | OUTPATIENT
Start: 2024-09-17 | End: 2024-09-17

## 2024-09-17 RX ORDER — SODIUM CHLORIDE, SODIUM LACTATE, POTASSIUM CHLORIDE, CALCIUM CHLORIDE 600; 310; 30; 20 MG/100ML; MG/100ML; MG/100ML; MG/100ML
INJECTION, SOLUTION INTRAVENOUS CONTINUOUS
Status: DISCONTINUED | OUTPATIENT
Start: 2024-09-17 | End: 2024-09-17 | Stop reason: HOSPADM

## 2024-09-17 RX ORDER — PHENYLEPHRINE HCL IN 0.9% NACL 1 MG/10 ML
SYRINGE (ML) INTRAVENOUS
Status: DISCONTINUED | OUTPATIENT
Start: 2024-09-17 | End: 2024-09-17

## 2024-09-17 RX ORDER — PROCHLORPERAZINE EDISYLATE 5 MG/ML
5 INJECTION INTRAMUSCULAR; INTRAVENOUS ONCE AS NEEDED
Status: COMPLETED | OUTPATIENT
Start: 2024-09-17 | End: 2024-09-17

## 2024-09-17 RX ORDER — GLUCAGON 1 MG
1 KIT INJECTION
Status: DISCONTINUED | OUTPATIENT
Start: 2024-09-17 | End: 2024-09-17 | Stop reason: HOSPADM

## 2024-09-17 RX ORDER — PROPOFOL 10 MG/ML
VIAL (ML) INTRAVENOUS
Status: DISCONTINUED | OUTPATIENT
Start: 2024-09-17 | End: 2024-09-17

## 2024-09-17 RX ORDER — KETAMINE HCL IN 0.9 % NACL 50 MG/5 ML
SYRINGE (ML) INTRAVENOUS
Status: DISCONTINUED | OUTPATIENT
Start: 2024-09-17 | End: 2024-09-17

## 2024-09-17 RX ORDER — HYDROCODONE BITARTRATE AND ACETAMINOPHEN 5; 325 MG/1; MG/1
1 TABLET ORAL EVERY 4 HOURS PRN
Status: DISCONTINUED | OUTPATIENT
Start: 2024-09-17 | End: 2024-09-17 | Stop reason: HOSPADM

## 2024-09-17 RX ORDER — ROCURONIUM BROMIDE 10 MG/ML
INJECTION, SOLUTION INTRAVENOUS
Status: DISCONTINUED | OUTPATIENT
Start: 2024-09-17 | End: 2024-09-17

## 2024-09-17 RX ADMIN — HYDROMORPHONE HYDROCHLORIDE 0.2 MG: 2 INJECTION INTRAMUSCULAR; INTRAVENOUS; SUBCUTANEOUS at 09:09

## 2024-09-17 RX ADMIN — MIDAZOLAM 2 MG: 1 INJECTION INTRAMUSCULAR; INTRAVENOUS at 06:09

## 2024-09-17 RX ADMIN — Medication 100 MCG: at 07:09

## 2024-09-17 RX ADMIN — CEFAZOLIN 2 G: 330 INJECTION, POWDER, FOR SOLUTION INTRAMUSCULAR; INTRAVENOUS at 07:09

## 2024-09-17 RX ADMIN — OXYCODONE HYDROCHLORIDE 5 MG: 5 TABLET ORAL at 09:09

## 2024-09-17 RX ADMIN — ROCURONIUM BROMIDE 60 MG: 10 INJECTION INTRAVENOUS at 07:09

## 2024-09-17 RX ADMIN — DEXAMETHASONE SODIUM PHOSPHATE 8 MG: 4 INJECTION, SOLUTION INTRA-ARTICULAR; INTRALESIONAL; INTRAMUSCULAR; INTRAVENOUS; SOFT TISSUE at 07:09

## 2024-09-17 RX ADMIN — SUGAMMADEX 200 MG: 100 INJECTION, SOLUTION INTRAVENOUS at 08:09

## 2024-09-17 RX ADMIN — PROPOFOL 200 MG: 10 INJECTION, EMULSION INTRAVENOUS at 07:09

## 2024-09-17 RX ADMIN — Medication 20 MG: at 07:09

## 2024-09-17 RX ADMIN — EPHEDRINE SULFATE 15 MG: 50 INJECTION, SOLUTION INTRAMUSCULAR; INTRAVENOUS; SUBCUTANEOUS at 07:09

## 2024-09-17 RX ADMIN — ROCURONIUM BROMIDE 10 MG: 10 INJECTION INTRAVENOUS at 08:09

## 2024-09-17 RX ADMIN — LIDOCAINE HYDROCHLORIDE 100 MG: 20 INJECTION, SOLUTION EPIDURAL; INFILTRATION; INTRACAUDAL at 07:09

## 2024-09-17 RX ADMIN — DEXMEDETOMIDINE 8 MCG: 100 INJECTION, SOLUTION INTRAVENOUS at 08:09

## 2024-09-17 RX ADMIN — ONDANSETRON 4 MG: 2 INJECTION INTRAMUSCULAR; INTRAVENOUS at 10:09

## 2024-09-17 RX ADMIN — PROCHLORPERAZINE EDISYLATE 5 MG: 5 INJECTION INTRAMUSCULAR; INTRAVENOUS at 11:09

## 2024-09-17 RX ADMIN — DEXMEDETOMIDINE 16 MCG: 100 INJECTION, SOLUTION INTRAVENOUS at 07:09

## 2024-09-17 RX ADMIN — ROCURONIUM BROMIDE 20 MG: 10 INJECTION INTRAVENOUS at 07:09

## 2024-09-17 RX ADMIN — FENTANYL CITRATE 100 MCG: 50 INJECTION INTRAMUSCULAR; INTRAVENOUS at 07:09

## 2024-09-17 RX ADMIN — Medication 10 MG: at 08:09

## 2024-09-17 NOTE — ANESTHESIA PROCEDURE NOTES
Intubation    Date/Time: 9/17/2024 7:04 AM    Performed by: Jayy Tinoco MD  Authorized by: Tremayne Hussein MD    Intubation:     Induction:  Intravenous    Mask Ventilation:  Easy mask    Attempts:  1    Attempted By:  Resident anesthesiologist    Method of Intubation:  Direct    Blade:  Gracia 4    Laryngeal View Grade: Grade IIA - cords partially seen      Difficult Airway Encountered?: No      Complications:  None    Airway Device:  Oral endotracheal tube    Airway Device Size:  7.5    Style/Cuff Inflation:  Cuffed (inflated to minimal occlusive pressure)    Inflation Amount (mL):  6    Secured at:  The lips    Placement Verified By:  Capnometry    Complicating Factors:  None    Findings Post-Intubation:  BS equal bilateral

## 2024-09-17 NOTE — OP NOTE
DATE OF PROCEDURE:  9/17/2024    PREOPERATIVE DIAGNOSIS:  Recurrent Ventral hernia    POSTOPERATIVE DIAGNOSIS:  Recurrent Ventral hernia    PROCEDURE:  Robot assistant laparoscopic recurrent ventral hernia repair with 9cm symbotex mesh    SURGEON:  Kannan Quiles M.D.    ASSISTANT:  Phong Jiménez PGY4    ANESTHESIA:  General endotracheal.    PREP:  Chlorhexidine.    SPECIMEN:  None    ESTIMATED BLOOD LOSS:  Minimal.    INDICATIONS:  The patient is a 43M who presents to clinic with   Symptomatic ventral hernia.  The patient was counseled on his options for   treatment and desired to proceed with surgical intervention.  The risks of the   procedure were described to the patient including bleeding, infection, pain,   scarring, wound complications, and recurrence.  The patient demonstrated understanding of these risks and a consent   form was obtained.    PROCEDURE:  The patient was identified in the Preoperative Unit and taken back   to the Operating Room and laid supine on the operating room table.  IV   antibiotics were administered prior to the induction of general anesthesia.    General anesthesia was induced without complication.  The patient was then   prepped and draped in standard sterile fashion manner.  A timeout procedure was   performed in accordance of hospital protocol.      A 8 mm incision was made in the left upper quadrant location using a #15 blade scalpel.  A 8mm optical trocar was used to enter the abdomen.  Once we confirmed an   intra-abdominal presence, CO2 insufflation was initiated.  A camera was inserted and the abdomen   was inspected.  There did not appear to be any abnormalities within the   Abdomen.    Exparel was injected bilaterally along the TAP plane.    At this point, an 8mm left sided trocar and a 8-mm left lower quadrant   trocars were then placed under direct visualization.   The hernia defect was identified and did not contain any bowel.    There were two defects. One was just  right lateral to the previous repair. A tiny portion of mesh was noted in the midline at the umbilicus. This was left in place.   An additional subcm hernia was noted just above the umbilicus.   The distance between the inferior and superior defect measured about 3cm. The preperitoneal fat was then taken down starting left lateral side using the blunt dissection. The defects were closed using 1 PDS strattafix suture in a running fashion. A 9cm round symbotex mesh was then inserted and secured using 2-0 vloc sutures circumferentially.  The peritoneum was sutured over the mesh using running 2-0 vloc suture completely covering the mesh.   The mesh was noted to be flat and it good position with good coverage of the hernia. Skin was closed using 4-0 monocryl and sterile dressings were applied.      The patient was awakened from anesthesia without   complication and returned to the Postop Recovery in stable condition.  At the   end of the case, sponge and needle counts were correct on 2 occasions.  I was   present and scrubbed throughout the entirety of the case.    COMPLICATIONS:  None.    CONDITION:  Stable.

## 2024-09-17 NOTE — ANESTHESIA POSTPROCEDURE EVALUATION
Anesthesia Post Evaluation    Patient: Jl Haynes    Procedure(s) Performed: Procedure(s) (LRB):  ROBOTIC REPAIR, HERNIA, VENTRAL (N/A)    Final Anesthesia Type: general      Patient location during evaluation: PACU  Patient participation: Yes- Able to Participate  Level of consciousness: awake and alert, oriented and awake  Post-procedure vital signs: reviewed and stable  Pain management: adequate  Airway patency: patent  EDUARDO mitigation strategies: Multimodal analgesia  PONV status at discharge: No PONV  Anesthetic complications: no      Cardiovascular status: blood pressure returned to baseline and hemodynamically stable  Respiratory status: unassisted and spontaneous ventilation  Hydration status: euvolemic  Follow-up not needed.              Vitals Value Taken Time   /72 09/17/24 1029   Temp 36.5 °C (97.7 °F) 09/17/24 0840   Pulse 80 09/17/24 1030   Resp 15 09/17/24 1030   SpO2 95 % 09/17/24 1030   Vitals shown include unfiled device data.      No case tracking events are documented in the log.      Pain/Edwige Score: Pain Rating Prior to Med Admin: 6 (9/17/2024  9:58 AM)  Edwige Score: 10 (9/17/2024  9:40 AM)

## 2024-09-17 NOTE — PLAN OF CARE
Pt meets all OPS discharge criteria. VSS. Pain well controlled. Able to void. Tolerating clear liquids. IV discontinued and discharge instructions given.

## 2024-09-17 NOTE — H&P
Chief Complaint: No chief complaint on file.        HPI:  HPI  43M with umbilical bulge for about 4 months.  Some pain with lifting. Overall mild. Works offshore, has to lift a lot.   Hx of UHR with prolene mesh in 2010.         Review of Systems   Constitutional:  Negative for chills, diaphoresis and fever.   HENT:  Negative for trouble swallowing.    Respiratory:  Negative for cough, shortness of breath, wheezing and stridor.    Cardiovascular:  Negative for chest pain and palpitations.   Gastrointestinal:  Negative for abdominal distention, abdominal pain, blood in stool, diarrhea, nausea and vomiting.   Endocrine: Negative for cold intolerance and heat intolerance.   Genitourinary:  Negative for difficulty urinating.   Musculoskeletal:  Negative for back pain.   Skin:  Negative for rash.   Allergic/Immunologic: Negative for immunocompromised state.   Neurological:  Negative for dizziness, syncope and numbness.   Hematological:  Negative for adenopathy.   Psychiatric/Behavioral:  Negative for agitation.          Current Medications          Current Outpatient Medications   Medication Sig Dispense Refill    acetaminophen (TYLENOL) 500 MG tablet Take 500 mg by mouth every 6 (six) hours as needed for Pain.        ciclopirox (PENLAC) 8 % Soln Apply topically nightly. 6 mL 6    clotrimazole-betamethasone 1-0.05% (LOTRISONE) cream Apply topically 2 (two) times daily. 45 g 5    glimepiride (AMARYL) 1 MG tablet Take 1 tablet (1 mg total) by mouth before breakfast. 90 tablet 3    losartan (COZAAR) 100 MG tablet Take 1 tablet (100 mg total) by mouth once daily. 90 tablet 3    metFORMIN (GLUCOPHAGE) 1000 MG tablet Take 1 tablet (1,000 mg total) by mouth 2 (two) times daily with meals. 180 tablet 3    pravastatin (PRAVACHOL) 40 MG tablet Take 1 tablet (40 mg total) by mouth once daily. 90 tablet 3    sildenafil (REVATIO) 20 mg Tab Take 3, 4, or 5 tablets at a time (as directed by provider) 1 hour before sexual activity  (Patient not taking: Reported on 2024) 50 tablet 11      No current facility-administered medications for this visit.                 Review of patient's allergies indicates:   Allergen Reactions    Penicillins Anaphylaxis              Past Medical History:   Diagnosis Date    Allergy      Depression      Enlarged prostate      Essential hypertension 2021    HTN (hypertension)      Hyperlipidemia      Type 2 diabetes mellitus with hyperosmolarity without coma, without long-term current use of insulin 2021               Past Surgical History:   Procedure Laterality Date    UMBILICAL HERNIA REPAIR             Social History            Socioeconomic History    Marital status:    Tobacco Use    Smoking status: Former       Current packs/day: 0.00       Types: Cigarettes       Quit date:        Years since quittin.5    Smokeless tobacco: Never   Substance and Sexual Activity    Alcohol use: Yes       Alcohol/week: 1.0 standard drink of alcohol       Types: 1 Standard drinks or equivalent per week       Comment: Rare social occasions.    Drug use: Not Currently       Types: Marijuana    Sexual activity: Not Currently             Vitals:     24 0811   BP: 125/85   Pulse: 64   Temp: 97.8 °F (36.6 °C)         Physical Exam  Constitutional:       General: He is not in acute distress.  HENT:      Head: Normocephalic and atraumatic.   Eyes:      General: No scleral icterus.  Cardiovascular:      Rate and Rhythm: Normal rate.   Pulmonary:      Effort: Pulmonary effort is normal. No respiratory distress.      Breath sounds: No stridor.   Abdominal:      Palpations: Abdomen is soft.      Tenderness: There is no abdominal tenderness.      Hernia: A hernia is present.        Lymphadenopathy:      Cervical: No cervical adenopathy.   Skin:     General: Skin is warm.      Findings: No erythema.   Neurological:      Mental Status: He is alert and oriented to person, place, and time.   Psychiatric:          Behavior: Behavior normal.      Body mass index is 33.86 kg/m².  Hga1c 5.8     Assessment & Plan:  43M with recurrent UH  To OR for robot VHR

## 2024-09-17 NOTE — TRANSFER OF CARE
"Anesthesia Transfer of Care Note    Patient: Jl Haynes    Procedure(s) Performed: Procedure(s) (LRB):  ROBOTIC REPAIR, HERNIA, VENTRAL (N/A)    Patient location: PACU    Anesthesia Type: general    Transport from OR: Transported from OR on 2-3 L/min O2 by NC with adequate spontaneous ventilation    Post pain: adequate analgesia    Post assessment: no apparent anesthetic complications    Post vital signs: stable    Level of consciousness: awake and alert    Nausea/Vomiting: no nausea/vomiting    Complications: none    Transfer of care protocol was followed      Last vitals: Visit Vitals  /85   Pulse 82   Temp 37.2 °C (99 °F) (Skin)   Resp 18   Ht 5' 11" (1.803 m)   Wt 111.1 kg (245 lb)   SpO2 96%   BMI 34.17 kg/m²     "

## 2024-09-18 DIAGNOSIS — E11.9 TYPE 2 DIABETES MELLITUS WITHOUT COMPLICATION, UNSPECIFIED WHETHER LONG TERM INSULIN USE: ICD-10-CM

## 2024-10-01 ENCOUNTER — OFFICE VISIT (OUTPATIENT)
Dept: SURGERY | Facility: CLINIC | Age: 44
End: 2024-10-01
Payer: COMMERCIAL

## 2024-10-01 VITALS
HEART RATE: 105 BPM | WEIGHT: 245.25 LBS | SYSTOLIC BLOOD PRESSURE: 120 MMHG | BODY MASS INDEX: 34.33 KG/M2 | DIASTOLIC BLOOD PRESSURE: 84 MMHG | HEIGHT: 71 IN

## 2024-10-01 DIAGNOSIS — Z98.890 S/P HERNIA REPAIR: Primary | ICD-10-CM

## 2024-10-01 DIAGNOSIS — Z87.19 S/P HERNIA REPAIR: Primary | ICD-10-CM

## 2024-10-01 PROCEDURE — 99213 OFFICE O/P EST LOW 20 MIN: CPT | Mod: S$GLB,,, | Performed by: STUDENT IN AN ORGANIZED HEALTH CARE EDUCATION/TRAINING PROGRAM

## 2024-10-01 PROCEDURE — 99999 PR PBB SHADOW E&M-EST. PATIENT-LVL III: CPT | Mod: PBBFAC,,, | Performed by: STUDENT IN AN ORGANIZED HEALTH CARE EDUCATION/TRAINING PROGRAM

## 2024-10-01 NOTE — PROGRESS NOTES
General Surgery  Post Op Visit    SUBJECTIVE:  Jl Haynes is a 43 y.o. male who presents to clinic today for post op follow up after Robotic Repair, Hernia, Ventral on 9/17/2024 . He  denies pain, fevers, chills, nausea, vomiting, diarrhea, and constipation and is returning to their usual activity level. He is tolerating diet with normal appetite and bowel function and denies redness around or drainage from incisions.    OBJECTIVE:  Vitals:    10/01/24 1507   BP: 120/84   Pulse: 105     Body mass index is 34.21 kg/m².    General: male in NAD. Not toxic appearing.   HENT: Normocephalic and atraumatic. EOM intact.   Pulmonary: No respiratory distress. Effort normal.  Cardiovascular: Regular rate.   Abdomen: soft, non-tender, non-distended. No evidence of recurrent hernia  Skin: Incisions are healing well without signs of infection. No erythema, drainage, or increased warmth noted.   MSK: normal range of motion  Neurological: No focal deficit present; alert and oriented to person, place, and time.  Psychiatric: normal mood and behavior    ASSESSMENT/PLAN:    Jl Haynes is a 43 y.o. y/o male who presents to clinic today for f/u s/p Robotic Repair, Hernia, Ventral on 9/17/2024. Clinically improving and meeting all post op milestones     - Patient is advised to avoid heavy lifting or strenuous activity for another 4 weeks.   - Follow-up PRN. Call clinic with any questions or concerns  - All questions answered; patient is comfortable with the above follow-up plan and verbalized understanding.    Martina Tanner MD   General Surgery PGY-2  10/1/2024

## 2024-10-01 NOTE — LETTER
October 1, 2024      [facility]  [address]  [address]  [facility] [address]  Phone: [phone]       Patient: Jl Haynes   YOB: 1980  Date of Visit: 10/01/2024    To Whom It May Concern:    Chidi Haynes  was at [facility] on 10/01/2024. The patient may return to work on 10/11/2024 with no restrictions. If you have any questions or concerns, or if I can be of further assistance, please do not hesitate to contact me.    Sincerely,    Dr. Kannan Quiles

## 2024-10-23 ENCOUNTER — PATIENT OUTREACH (OUTPATIENT)
Dept: ADMINISTRATIVE | Facility: HOSPITAL | Age: 44
End: 2024-10-23
Payer: COMMERCIAL

## 2024-10-23 NOTE — PROGRESS NOTES
Population Health Chart Review & Patient Outreach Details      Additional [facility] Health Notes:               Updates Requested / Reviewed:      Updated Care Coordination Note, Care Everywhere, and Immunizations Reconciliation Completed or Queried: [place]         Health Maintenance Topics Overdue:      [facility] Score: 1     Foot Exam                       Health Maintenance Topic(s) Outreach Outcomes & Actions Taken:    Eye Exam - Outreach Outcomes & Actions Taken  : Eye Camera Scheduled or Optometry/Ophthalmology Referral Placed/Appt Scheduled

## 2024-11-07 ENCOUNTER — CLINICAL SUPPORT (OUTPATIENT)
Dept: FAMILY MEDICINE | Facility: CLINIC | Age: 44
End: 2024-11-07
Attending: FAMILY MEDICINE
Payer: COMMERCIAL

## 2024-11-07 ENCOUNTER — OFFICE VISIT (OUTPATIENT)
Dept: FAMILY MEDICINE | Facility: CLINIC | Age: 44
End: 2024-11-07
Payer: COMMERCIAL

## 2024-11-07 ENCOUNTER — LAB VISIT (OUTPATIENT)
Dept: LAB | Facility: HOSPITAL | Age: 44
End: 2024-11-07
Attending: FAMILY MEDICINE
Payer: COMMERCIAL

## 2024-11-07 VITALS
HEIGHT: 71 IN | DIASTOLIC BLOOD PRESSURE: 76 MMHG | TEMPERATURE: 98 F | BODY MASS INDEX: 34.97 KG/M2 | OXYGEN SATURATION: 98 % | HEART RATE: 72 BPM | SYSTOLIC BLOOD PRESSURE: 130 MMHG | WEIGHT: 249.81 LBS

## 2024-11-07 DIAGNOSIS — I10 ESSENTIAL HYPERTENSION: ICD-10-CM

## 2024-11-07 DIAGNOSIS — E11.9 TYPE 2 DIABETES MELLITUS WITHOUT COMPLICATION, UNSPECIFIED WHETHER LONG TERM INSULIN USE: ICD-10-CM

## 2024-11-07 DIAGNOSIS — E11.00 TYPE 2 DIABETES MELLITUS WITH HYPEROSMOLARITY WITHOUT COMA, WITHOUT LONG-TERM CURRENT USE OF INSULIN: Primary | ICD-10-CM

## 2024-11-07 DIAGNOSIS — E78.2 MIXED HYPERLIPIDEMIA: ICD-10-CM

## 2024-11-07 DIAGNOSIS — Z23 ENCOUNTER FOR IMMUNIZATION: ICD-10-CM

## 2024-11-07 DIAGNOSIS — E66.09 CLASS 1 OBESITY DUE TO EXCESS CALORIES WITH SERIOUS COMORBIDITY AND BODY MASS INDEX (BMI) OF 34.0 TO 34.9 IN ADULT: ICD-10-CM

## 2024-11-07 DIAGNOSIS — E66.811 CLASS 1 OBESITY DUE TO EXCESS CALORIES WITH SERIOUS COMORBIDITY AND BODY MASS INDEX (BMI) OF 34.0 TO 34.9 IN ADULT: ICD-10-CM

## 2024-11-07 DIAGNOSIS — E11.00 TYPE 2 DIABETES MELLITUS WITH HYPEROSMOLARITY WITHOUT COMA, WITHOUT LONG-TERM CURRENT USE OF INSULIN: ICD-10-CM

## 2024-11-07 LAB
ALBUMIN SERPL BCP-MCNC: 4.5 G/DL (ref 3.5–5.2)
ALP SERPL-CCNC: 78 U/L (ref 40–150)
ALT SERPL W/O P-5'-P-CCNC: 61 U/L (ref 10–44)
ANION GAP SERPL CALC-SCNC: 11 MMOL/L (ref 8–16)
AST SERPL-CCNC: 27 U/L (ref 10–40)
BILIRUB SERPL-MCNC: 0.5 MG/DL (ref 0.1–1)
BUN SERPL-MCNC: 12 MG/DL (ref 6–20)
CALCIUM SERPL-MCNC: 10.3 MG/DL (ref 8.7–10.5)
CHLORIDE SERPL-SCNC: 104 MMOL/L (ref 95–110)
CO2 SERPL-SCNC: 24 MMOL/L (ref 23–29)
CREAT SERPL-MCNC: 1.1 MG/DL (ref 0.5–1.4)
EST. GFR  (NO RACE VARIABLE): >60 ML/MIN/1.73 M^2
ESTIMATED AVG GLUCOSE: 160 MG/DL (ref 68–131)
GLUCOSE SERPL-MCNC: 129 MG/DL (ref 70–110)
HBA1C MFR BLD: 7.2 % (ref 4–5.6)
POTASSIUM SERPL-SCNC: 4.2 MMOL/L (ref 3.5–5.1)
PROT SERPL-MCNC: 7.8 G/DL (ref 6–8.4)
SODIUM SERPL-SCNC: 139 MMOL/L (ref 136–145)

## 2024-11-07 PROCEDURE — 80053 COMPREHEN METABOLIC PANEL: CPT | Performed by: FAMILY MEDICINE

## 2024-11-07 PROCEDURE — 83036 HEMOGLOBIN GLYCOSYLATED A1C: CPT | Performed by: FAMILY MEDICINE

## 2024-11-07 PROCEDURE — 99999 PR PBB SHADOW E&M-EST. PATIENT-LVL IV: CPT | Mod: PBBFAC,,, | Performed by: FAMILY MEDICINE

## 2024-11-07 RX ORDER — LOSARTAN POTASSIUM 100 MG/1
100 TABLET ORAL DAILY
Qty: 90 TABLET | Refills: 3 | Status: SHIPPED | OUTPATIENT
Start: 2024-11-07

## 2024-11-07 RX ORDER — GLIMEPIRIDE 1 MG/1
1 TABLET ORAL
Qty: 90 TABLET | Refills: 3 | Status: SHIPPED | OUTPATIENT
Start: 2024-11-07 | End: 2025-11-07

## 2024-11-07 RX ORDER — PRAVASTATIN SODIUM 40 MG/1
40 TABLET ORAL DAILY
Qty: 90 TABLET | Refills: 3 | Status: SHIPPED | OUTPATIENT
Start: 2024-11-07

## 2024-11-07 RX ORDER — METFORMIN HYDROCHLORIDE 1000 MG/1
1000 TABLET ORAL 2 TIMES DAILY WITH MEALS
Qty: 180 TABLET | Refills: 3 | Status: SHIPPED | OUTPATIENT
Start: 2024-11-07

## 2024-11-07 NOTE — PROGRESS NOTES
(Portions of this note were dictated using voice recognition software and may contain dictation related errors in spelling/grammar/syntax not found on text review)    CC:   Chief Complaint   Patient presents with    Hypertension    Diabetes    Medication Refill       HPI: 44 y.o. male presented for follow up and medical management, needs medication refills.  He has medical history significant for type 2 diabetes mellitus, essential hypertension, mixed hyperlipidemia, allergic rhinitis, obesity.    He is taking metformin 1000 mg twice daily along with glimepiride 1 mg, reports compliance with the medication, monitors blood sugar at home and stated that it is not controlled, having high blood readings in the range of 200+, last A1c in chart went up to 6.8    He was on Ozempic previously which was stopped due to insurance, would like to restarted again, stated that he can not stopped eating and would like to have the medication to help with appetite and weight loss    He takes losartan 100 mg on daily basis, reports compliance with the medication blood pressure has been controlled at home, stated that he did not take it this morning    He reports ants crawling on feet sometimes when blood sugar is high    He does not smoke, has no toxic habits.    He is physically active for work, does not do formal exercise    No other symptoms or concerns.    Past Medical History:   Diagnosis Date    Allergy     Depression     Enlarged prostate     Essential hypertension 11/05/2021    HTN (hypertension)     Hyperlipidemia     Type 2 diabetes mellitus with hyperosmolarity without coma, without long-term current use of insulin 11/05/2021       Past Surgical History:   Procedure Laterality Date    ROBOT-ASSISTED LAPAROSCOPIC REPAIR OF VENTRAL HERNIA N/A 9/17/2024    Procedure: ROBOTIC REPAIR, HERNIA, VENTRAL;  Surgeon: Kannan Quiles MD;  Location: [facility];  Service: General;  Laterality: N/A;    UMBILICAL HERNIA REPAIR          Family History   Problem Relation Name Age of Onset    Diabetes Mother      Hypertension Mother      Hypertension Father      Diabetes Mellitus Maternal Grandmother      Dementia Maternal Grandmother      Heart attack Maternal Grandfather      Diabetes Mellitus Maternal Uncle      Prostate cancer Neg Hx      Kidney disease Neg Hx         Social History     Tobacco Use    Smoking status: Former     Current packs/day: 0.00     Types: Cigarettes     Quit date:      Years since quittin.8    Smokeless tobacco: Never   Substance Use Topics    Alcohol use: Yes     Alcohol/week: 1.0 standard drink of alcohol     Types: 1 Standard drinks or equivalent per week     Comment: Rare social occasions.    Drug use: Not Currently     Types: Marijuana       Lab Results   Component Value Date    WBC 6.34 2024    HGB 14.9 2024    HCT 42.6 2024    MCV 85 2024     2024    CHOL 163 2024    TRIG 372 (H) 2024    HDL 31 (L) 2024    ALT 50 (H) 2024    AST 23 2024    BILITOT 0.6 2024    ALKPHOS 73 2024     2024    K 4.4 2024     2024    CREATININE 1.1 2024    ESTGFRAFRICA >60 2022    ESTGFRAFRICA >60 2022    EGFRNONAA >60 2022    EGFRNONAA >60 2022    CALCIUM 9.8 2024    ALBUMIN 4.3 2024    BUN 10 2024    CO2 24 2024    TSH 1.254 2023    PSA 0.84 10/05/2021    HGBA1C 6.8 (H) 2024    MICALBCREAT 7.0 2023    LDLCALC 57.6 (L) 2024     (H) 2024    [id number] 27 (L) 10/05/2021             Vital signs reviewed  PE:   APPEARANCE: Well nourished, well developed, in no acute distress.    HEAD: Normocephalic, atraumatic.  EYES: EOMI.  Conjunctivae noninjected.  NOSE: Mucosa pink. Airway clear.  NECK: Supple with no cervical lymphadenopathy.    CHEST: Good inspiratory effort. Lungs clear to auscultation with no wheezes or  crackles.  CARDIOVASCULAR: Normal S1, S2. No rubs, murmurs, or gallops.  ABDOMEN: Bowel sounds normal. Not distended. Soft. No tenderness or masses. No organomegaly.  EXTREMITIES: No edema, cyanosis, or clubbing.  Sensation intact with monofilaments, pulses normal B/L      Review of Systems   Constitutional:  Negative for chills, fatigue and fever.   HENT: Negative.     Respiratory:  Negative for cough, shortness of breath and wheezing.    Cardiovascular:  Negative for chest pain, palpitations and leg swelling.   Gastrointestinal: Negative.    Genitourinary: Negative.    Musculoskeletal: Negative.    Neurological: Negative.    Psychiatric/Behavioral: Negative.     All other systems reviewed and are negative.      IMPRESSION  1. Type 2 diabetes mellitus with hyperosmolarity without coma, without long-term current use of insulin    2. Essential hypertension    3. Mixed hyperlipidemia    4. Encounter for immunization    5. Class 1 obesity due to excess calories with serious comorbidity and body mass index (BMI) of 34.0 to 34.9 in adult            PLAN      1. Essential hypertension    - losartan (COZAAR) 100 MG tablet; Take 1 tablet (100 mg total) by mouth once daily.  Dispense: 90 tablet; Refill: 3      2. Mixed hyperlipidemia    - pravastatin (PRAVACHOL) 40 MG tablet; Take 1 tablet (40 mg total) by mouth once daily.  Dispense: 90 tablet; Refill: 3      3. Type 2 diabetes mellitus with hyperosmolarity without coma, without long-term current use of insulin (Primary)    - glimepiride (AMARYL) 1 MG tablet; Take 1 tablet (1 mg total) by mouth before breakfast.  Dispense: 90 tablet; Refill: 3    - semaglutide (OZEMPIC) 0.25 mg or 0.5 mg (2 mg/3 mL) pen injector; Inject 0.5 mg into the skin every 7 days.  Dispense: 3 mL; Refill: 4    - metFORMIN (GLUCOPHAGE) 1000 MG tablet; Take 1 tablet (1,000 mg total) by mouth 2 (two) times daily with meals.  Dispense: 180 tablet; Refill: 3    - Hemoglobin A1C; Future  - Comprehensive  Metabolic Panel; Future  - Microalbumin/Creatinine Ratio, Urine; Future      4. Encounter for immunization    - influenza (Flulaval, Fluzone, Fluarix) 45 mcg/0.5 mL IM vaccine (> or = 6 mo) 0.5 mL    - COVID-19 (Pfizer) 30 mcg/0.3 mL IM vaccine (>/= 11 yo) 0.3 mL      5. Class 1 obesity due to excess calories with serious comorbidity and body mass index (BMI) of 34.0 to 34.9 in adult    Counseling provided on healthy lifestyle, encouraged to do moderate intensity regular exercise 30 minutes every day 5 days a week, to include vegetables and fruits and cut back on saturated fats and carbohydrates.         SCREENINGS      Immunizations:     Flu vaccine and Covid booster today      Age/demographic appropriate health maintenance:    Health Maintenance Due   Topic Date Due    Eye Exam  06/28/2023    COVID-19 Vaccine (5 - 2024-25 season) 09/01/2024    Diabetes Urine Screening  11/22/2024           Spent adequate time in obtaining history and explaining differentials     40 minutes spent during this visit of which greater than 50% devoted to face-face counseling and coordination of care regarding diagnosis and management plan       Mateo Wright   11/7/2024

## 2024-11-25 DIAGNOSIS — B35.3 TINEA PEDIS, UNSPECIFIED LATERALITY: ICD-10-CM

## 2024-11-25 DIAGNOSIS — Z87.438 H/O BALANITIS: ICD-10-CM

## 2024-11-26 RX ORDER — CLOTRIMAZOLE AND BETAMETHASONE DIPROPIONATE 10; .64 MG/G; MG/G
CREAM TOPICAL 2 TIMES DAILY
Qty: 45 G | Refills: 5 | Status: SHIPPED | OUTPATIENT
Start: 2024-11-26

## 2024-11-27 ENCOUNTER — TELEPHONE (OUTPATIENT)
Dept: FAMILY MEDICINE | Facility: CLINIC | Age: 44
End: 2024-11-27
Payer: COMMERCIAL

## 2024-11-27 DIAGNOSIS — B49 FUNGAL INFECTION: Primary | ICD-10-CM

## 2024-11-27 RX ORDER — CLOTRIMAZOLE 1 %
CREAM (GRAM) TOPICAL 2 TIMES DAILY
Qty: 24 G | Refills: 0 | Status: SHIPPED | OUTPATIENT
Start: 2024-11-27

## 2024-11-27 NOTE — TELEPHONE ENCOUNTER
----- Message from Keturah sent at 11/27/2024 10:45 AM CST -----  Contact: Michelle  Type:  Medication     Who Called: Michelle blanco/ [facility]   Would the patient rather a call back or a response via [facility]? call  Best Call Back Number: [phone]  Additional Information:   Michelle stated this medication  clotrimazole-betamethasone 1-0.05% (LOTRISONE) cream need to be one of the other 3 creams first  due to ins.  Kecoconazole Ciclopirox and Nystatin

## 2025-02-20 ENCOUNTER — PATIENT MESSAGE (OUTPATIENT)
Dept: ADMINISTRATIVE | Facility: HOSPITAL | Age: 45
End: 2025-02-20
Payer: COMMERCIAL

## 2025-04-21 ENCOUNTER — LAB VISIT (OUTPATIENT)
Dept: LAB | Facility: HOSPITAL | Age: 45
End: 2025-04-21
Attending: FAMILY MEDICINE
Payer: COMMERCIAL

## 2025-04-21 ENCOUNTER — RESULTS FOLLOW-UP (OUTPATIENT)
Dept: FAMILY MEDICINE | Facility: CLINIC | Age: 45
End: 2025-04-21

## 2025-04-21 ENCOUNTER — OFFICE VISIT (OUTPATIENT)
Dept: FAMILY MEDICINE | Facility: CLINIC | Age: 45
End: 2025-04-21
Payer: COMMERCIAL

## 2025-04-21 VITALS
BODY MASS INDEX: 33.46 KG/M2 | TEMPERATURE: 98 F | HEART RATE: 78 BPM | OXYGEN SATURATION: 98 % | HEIGHT: 71 IN | DIASTOLIC BLOOD PRESSURE: 78 MMHG | WEIGHT: 239 LBS | SYSTOLIC BLOOD PRESSURE: 130 MMHG

## 2025-04-21 DIAGNOSIS — E78.2 MIXED HYPERLIPIDEMIA: ICD-10-CM

## 2025-04-21 DIAGNOSIS — I10 ESSENTIAL HYPERTENSION: ICD-10-CM

## 2025-04-21 DIAGNOSIS — E66.811 CLASS 1 OBESITY DUE TO EXCESS CALORIES WITH SERIOUS COMORBIDITY AND BODY MASS INDEX (BMI) OF 33.0 TO 33.9 IN ADULT: ICD-10-CM

## 2025-04-21 DIAGNOSIS — Z00.00 ANNUAL PHYSICAL EXAM: ICD-10-CM

## 2025-04-21 DIAGNOSIS — Z00.00 ANNUAL PHYSICAL EXAM: Primary | ICD-10-CM

## 2025-04-21 DIAGNOSIS — R51.9 SINUS HEADACHE: ICD-10-CM

## 2025-04-21 DIAGNOSIS — E11.9 TYPE 2 DIABETES MELLITUS WITHOUT COMPLICATION, WITHOUT LONG-TERM CURRENT USE OF INSULIN: ICD-10-CM

## 2025-04-21 DIAGNOSIS — E66.09 CLASS 1 OBESITY DUE TO EXCESS CALORIES WITH SERIOUS COMORBIDITY AND BODY MASS INDEX (BMI) OF 33.0 TO 33.9 IN ADULT: ICD-10-CM

## 2025-04-21 LAB
ABSOLUTE EOSINOPHIL (OHS): 0.1 K/UL
ABSOLUTE MONOCYTE (OHS): 0.49 K/UL (ref 0.3–1)
ABSOLUTE NEUTROPHIL COUNT (OHS): 3.74 K/UL (ref 1.8–7.7)
ALBUMIN SERPL BCP-MCNC: 4.2 G/DL (ref 3.5–5.2)
ALP SERPL-CCNC: 84 UNIT/L (ref 40–150)
ALT SERPL W/O P-5'-P-CCNC: 64 UNIT/L (ref 10–44)
ANION GAP (OHS): 9 MMOL/L (ref 8–16)
AST SERPL-CCNC: 33 UNIT/L (ref 11–45)
BASOPHILS # BLD AUTO: 0.03 K/UL
BASOPHILS NFR BLD AUTO: 0.5 %
BILIRUB SERPL-MCNC: 0.5 MG/DL (ref 0.1–1)
BUN SERPL-MCNC: 8 MG/DL (ref 6–20)
CALCIUM SERPL-MCNC: 9.5 MG/DL (ref 8.7–10.5)
CHLORIDE SERPL-SCNC: 106 MMOL/L (ref 95–110)
CHOLEST SERPL-MCNC: 144 MG/DL (ref 120–199)
CHOLEST/HDLC SERPL: 3.8 {RATIO} (ref 2–5)
CO2 SERPL-SCNC: 24 MMOL/L (ref 23–29)
CREAT SERPL-MCNC: 1 MG/DL (ref 0.5–1.4)
EAG (OHS): 143 MG/DL (ref 68–131)
ERYTHROCYTE [DISTWIDTH] IN BLOOD BY AUTOMATED COUNT: 13.5 % (ref 11.5–14.5)
GFR SERPLBLD CREATININE-BSD FMLA CKD-EPI: >60 ML/MIN/1.73/M2
GLUCOSE SERPL-MCNC: 122 MG/DL (ref 70–110)
HBA1C MFR BLD: 6.6 % (ref 4–5.6)
HCT VFR BLD AUTO: 44.2 % (ref 40–54)
HDLC SERPL-MCNC: 38 MG/DL (ref 40–75)
HDLC SERPL: 26.4 % (ref 20–50)
HGB BLD-MCNC: 14.9 GM/DL (ref 14–18)
IMM GRANULOCYTES # BLD AUTO: 0.02 K/UL (ref 0–0.04)
IMM GRANULOCYTES NFR BLD AUTO: 0.3 % (ref 0–0.5)
LDLC SERPL CALC-MCNC: 77.6 MG/DL (ref 63–159)
LYMPHOCYTES # BLD AUTO: 2.17 K/UL (ref 1–4.8)
MCH RBC QN AUTO: 29 PG (ref 27–31)
MCHC RBC AUTO-ENTMCNC: 33.7 G/DL (ref 32–36)
MCV RBC AUTO: 86 FL (ref 82–98)
NONHDLC SERPL-MCNC: 106 MG/DL
NUCLEATED RBC (/100WBC) (OHS): 0 /100 WBC
PLATELET # BLD AUTO: 242 K/UL (ref 150–450)
PMV BLD AUTO: 9 FL (ref 9.2–12.9)
POTASSIUM SERPL-SCNC: 4.6 MMOL/L (ref 3.5–5.1)
PROT SERPL-MCNC: 7.7 GM/DL (ref 6–8.4)
RBC # BLD AUTO: 5.13 M/UL (ref 4.6–6.2)
RELATIVE EOSINOPHIL (OHS): 1.5 %
RELATIVE LYMPHOCYTE (OHS): 33.1 % (ref 18–48)
RELATIVE MONOCYTE (OHS): 7.5 % (ref 4–15)
RELATIVE NEUTROPHIL (OHS): 57.1 % (ref 38–73)
SODIUM SERPL-SCNC: 139 MMOL/L (ref 136–145)
TRIGL SERPL-MCNC: 142 MG/DL (ref 30–150)
TSH SERPL-ACNC: 1.59 UIU/ML (ref 0.4–4)
WBC # BLD AUTO: 6.55 K/UL (ref 3.9–12.7)

## 2025-04-21 PROCEDURE — 84443 ASSAY THYROID STIM HORMONE: CPT

## 2025-04-21 PROCEDURE — 36415 COLL VENOUS BLD VENIPUNCTURE: CPT

## 2025-04-21 PROCEDURE — 80051 ELECTROLYTE PANEL: CPT

## 2025-04-21 PROCEDURE — 83036 HEMOGLOBIN GLYCOSYLATED A1C: CPT

## 2025-04-21 PROCEDURE — 80061 LIPID PANEL: CPT

## 2025-04-21 PROCEDURE — 99396 PREV VISIT EST AGE 40-64: CPT | Mod: S$GLB,,, | Performed by: FAMILY MEDICINE

## 2025-04-21 PROCEDURE — 99999 PR PBB SHADOW E&M-EST. PATIENT-LVL IV: CPT | Mod: PBBFAC,,, | Performed by: FAMILY MEDICINE

## 2025-04-21 PROCEDURE — 85025 COMPLETE CBC W/AUTO DIFF WBC: CPT

## 2025-04-21 NOTE — PROGRESS NOTES
(Portions of this note were dictated using voice recognition software and may contain dictation related errors in spelling/grammar/syntax not found on text review)    CC:   Chief Complaint   Patient presents with    Annual Exam     Headache       HPI: 44 y.o. male presented for routine annual examination and labs.  He has medical history significant for type 2 diabetes mellitus, essential hypertension, mixed hyperlipidemia, allergic rhinitis, obesity.     He is taking metformin 1000 mg twice daily along with glimepiride 1 mg and ozempic 0.5 mg weekly, reports compliance with the medication     He takes losartan 100 mg on daily basis, reports compliance with the medication, blood pressure has been controlled at home     He reports having morning headache, started 3 days ago, had sinus symptoms on and off with nasal congestion and states was sick for 2 months with flu and now slightly better    He does not smoke, has no toxic habits.     He is physically active for work, walks 4 to 5 miles daily    No other symptoms or concerns.    Past Medical History:   Diagnosis Date    Allergy     Depression     Enlarged prostate     Essential hypertension 11/05/2021    HTN (hypertension)     Hyperlipidemia     Type 2 diabetes mellitus with hyperosmolarity without coma, without long-term current use of insulin 11/05/2021       Past Surgical History:   Procedure Laterality Date    ROBOT-ASSISTED LAPAROSCOPIC REPAIR OF VENTRAL HERNIA N/A 9/17/2024    Procedure: ROBOTIC REPAIR, HERNIA, VENTRAL;  Surgeon: Kannan Quiles MD;  Location: [facility] OR;  Service: General;  Laterality: N/A;    UMBILICAL HERNIA REPAIR         Family History   Problem Relation Name Age of Onset    Diabetes Mother      Hypertension Mother      Hypertension Father      Diabetes Mellitus Maternal Grandmother      Dementia Maternal Grandmother      Heart attack Maternal Grandfather      Diabetes Mellitus Maternal Uncle      Prostate cancer Neg Hx      Kidney  disease Neg Hx         Social History[1]    Lab Results   Component Value Date    WBC 6.34 05/20/2024    HGB 14.9 05/20/2024    HCT 42.6 05/20/2024    MCV 85 05/20/2024     05/20/2024    CHOL 163 08/21/2024    TRIG 372 (H) 08/21/2024    HDL 31 (L) 08/21/2024    ALT 61 (H) 11/07/2024    AST 27 11/07/2024    BILITOT 0.5 11/07/2024    ALKPHOS 78 11/07/2024     11/07/2024    K 4.2 11/07/2024     11/07/2024    CREATININE 1.1 11/07/2024    ESTGFRAFRICA >60 02/03/2022    ESTGFRAFRICA >60 02/03/2022    EGFRNONAA >60 02/03/2022    EGFRNONAA >60 02/03/2022    CALCIUM 10.3 11/07/2024    ALBUMIN 4.5 11/07/2024    BUN 12 11/07/2024    CO2 24 11/07/2024    TSH 1.254 11/22/2023    PSA 0.84 10/05/2021    HGBA1C 7.2 (H) 11/07/2024    MICALBCREAT 8.5 11/07/2024    LDLCALC 57.6 (L) 08/21/2024     (H) 11/07/2024    [id number] 27 (L) 10/05/2021             Vital signs reviewed  PE:   APPEARANCE: Well nourished, well developed, in no acute distress.    HEAD: Normocephalic, atraumatic.  EYES: EOMI.  Conjunctivae noninjected.  NOSE: Mucosa pink. Airway clear.  NECK: Supple with no cervical lymphadenopathy.    CHEST: Good inspiratory effort. Lungs clear to auscultation with no wheezes or crackles.  CARDIOVASCULAR: Normal S1, S2. No rubs, murmurs, or gallops.  ABDOMEN: Bowel sounds normal. Not distended. Soft. No tenderness or masses. No organomegaly.  EXTREMITIES: No edema, cyanosis, or clubbing.    Review of Systems   Constitutional:  Negative for chills and fatigue.   HENT: Negative.     Respiratory:  Negative for cough, shortness of breath and wheezing.    Cardiovascular:  Negative for chest pain, palpitations and leg swelling.   Gastrointestinal: Negative.    Endocrine: Positive for polyphagia and polyuria.   Genitourinary: Negative.    Musculoskeletal: Negative.    Integumentary:  Negative for pallor.   Neurological:  Negative for dizziness, tremors, seizures, speech difficulty, weakness and headaches.    Psychiatric/Behavioral:  Negative for confusion. The patient is not nervous/anxious.    All other systems reviewed and are negative.      IMPRESSION  1. Annual physical exam    2. Type 2 diabetes mellitus without complication, without long-term current use of insulin    3. Essential hypertension    4. Mixed hyperlipidemia    5. Class 1 obesity due to excess calories with serious comorbidity and body mass index (BMI) of 33.0 to 33.9 in adult    6. Sinus headache            PLAN      1. Type 2 diabetes mellitus without complication, without long-term current use of insulin    - Comprehensive Metabolic Panel; Future    - Hemoglobin A1C; Future    - semaglutide (OZEMPIC) 1 mg/dose (4 mg/3 mL); Inject 1 mg into the skin every 7 days.  Dispense: 3 mL; Refill: 11      2. Essential hypertension    - CBC Auto Differential; Future  - TSH; Future    Controlled    Continue current medications      3. Mixed hyperlipidemia    - Lipid Panel; Future    On statin      4. Annual physical exam (Primary)    - CBC Auto Differential; Future  - Comprehensive Metabolic Panel; Future  - TSH; Future  - Hemoglobin A1C; Future  - Lipid Panel; Future      5. Class 1 obesity due to excess calories with serious comorbidity and body mass index (BMI) of 33.0 to 33.9 in adult    Counseling provided on healthy lifestyle, encouraged to do moderate intensity regular exercise 30 minutes every day 5 days a week, to include vegetables and fruits and cut back on saturated fats and carbohydrates.       6. Sinus headache    Advised to take anti histamine and Flonase nasal spray as needed         SCREENINGS        Age/demographic appropriate health maintenance:    Health Maintenance Due   Topic Date Due    Diabetic Eye Exam  06/28/2023    Hemoglobin A1c  05/07/2025           Spent adequate time in obtaining history and explaining differentials     35 minutes spent during this visit of which greater than 50% devoted to face-face counseling and coordination  [facility] regarding diagnosis and management plan         Mateo Wright   4/21/2025  Answers submitted by the patient for this visit:  Diabetes Questionnaire (Submitted on 4/21/2025)  Chief Complaint: Diabetes problem  Diabetes type: type 2  MedicAlert ID: No  Disease duration: 6 Years  blurred vision: No  foot paresthesias: No  foot ulcerations: No  visual change: No  weight loss: No  Symptom course: stable  hunger: No  mood changes: No  sleepiness: No  sweats: No  blackouts: No  hospitalization: No  nocturnal hypoglycemia: No  required assistance: No  required glucagon: No  CVA: No  heart disease: No  impotence: No  nephropathy: No  peripheral neuropathy: No  PVD: No  retinopathy: No  autonomic neuropathy: No  CAD risks: dyslipidemia, hypertension, diabetes mellitus  Treatment compliance: all of the time  Home blood tests: 1-2 x per week  Monitoring compliance: adequate  Blood glucose trend: no change  Dietitian visit: No  Eye exam current: No  Sees podiatrist: No         [1]   Social History  Tobacco Use    Smoking status: Former     Current packs/day: 0.00     Types: Cigarettes     Quit date: 2015     Years since quitting: 10.3    Smokeless tobacco: Never   Substance Use Topics    Alcohol use: Yes     Alcohol/week: 1.0 standard drink of alcohol     Types: 1 Standard drinks or equivalent per week     Comment: Rare social occasions.    Drug use: Not Currently     Types: Marijuana

## 2025-05-06 ENCOUNTER — TELEPHONE (OUTPATIENT)
Dept: FAMILY MEDICINE | Facility: CLINIC | Age: 45
End: 2025-05-06
Payer: COMMERCIAL

## 2025-05-06 NOTE — TELEPHONE ENCOUNTER
----- Message from Sandrine sent at 5/2/2025  3:45 PM CDT -----  Type:  CallWho Called:Lacy/ [facility] HealthDoes the patient know what this is regarding?:MedWould the patient rather a call back or a response via [facility]? [facility] Call Back Number:[phone] Information: clotrimazole-betamethasone 1-0.05% (LOTRISONE) cream is rejecting for self therapy.  Calling to provide alternatives.

## 2025-06-09 ENCOUNTER — PATIENT MESSAGE (OUTPATIENT)
Dept: ADMINISTRATIVE | Facility: HOSPITAL | Age: 45
End: 2025-06-09
Payer: COMMERCIAL

## 2025-07-16 DIAGNOSIS — E11.9 TYPE 2 DIABETES MELLITUS WITHOUT COMPLICATION, WITHOUT LONG-TERM CURRENT USE OF INSULIN: ICD-10-CM

## 2025-07-17 RX ORDER — SEMAGLUTIDE 0.68 MG/ML
0.5 INJECTION, SOLUTION SUBCUTANEOUS
OUTPATIENT
Start: 2025-07-17

## 2025-08-22 ENCOUNTER — PATIENT OUTREACH (OUTPATIENT)
Dept: ADMINISTRATIVE | Facility: HOSPITAL | Age: 45
End: 2025-08-22
Payer: COMMERCIAL

## (undated) DEVICE — DRAPE COLUMN DAVINCI XI

## (undated) DEVICE — SUT STRATAFIX 0 PDS+ 23CM 9IN

## (undated) DEVICE — ELECTRODE REM PLYHSV RETURN 9

## (undated) DEVICE — GLOVE SURGICAL LATEX SZ 7

## (undated) DEVICE — SEAL UNIVERSAL 5MM-8MM XI

## (undated) DEVICE — COVER MAYO STND XL 30X57IN

## (undated) DEVICE — ADHESIVE DERMABOND ADVANCED

## (undated) DEVICE — COVER TIP CURVED SCISSORS XI

## (undated) DEVICE — TUBING INSUFFLATION W/LUER LOK

## (undated) DEVICE — SUT V-LOC 90 GS22 2-0 VIO 23CM

## (undated) DEVICE — PAD PINK TRENDELENBURG POS XL

## (undated) DEVICE — OBTURATOR BLADELESS 8MM XI CLR

## (undated) DEVICE — TROCAR ENDOPATH XCEL 5X100MM

## (undated) DEVICE — SUT MCRYL PLUS 4-0 PS2 27IN

## (undated) DEVICE — DRAPE ARM DAVINCI XI

## (undated) DEVICE — NDL 22GA X1 1/2 REG BEVEL

## (undated) DEVICE — SUT VICRYL CTD 2-0 GI 27 SH

## (undated) DEVICE — Device